# Patient Record
Sex: MALE | Race: WHITE | NOT HISPANIC OR LATINO | Employment: FULL TIME | URBAN - METROPOLITAN AREA
[De-identification: names, ages, dates, MRNs, and addresses within clinical notes are randomized per-mention and may not be internally consistent; named-entity substitution may affect disease eponyms.]

---

## 2021-05-27 ENCOUNTER — RECORDS - HEALTHEAST (OUTPATIENT)
Dept: ADMINISTRATIVE | Facility: CLINIC | Age: 56
End: 2021-05-27

## 2021-05-29 ENCOUNTER — RECORDS - HEALTHEAST (OUTPATIENT)
Dept: ADMINISTRATIVE | Facility: CLINIC | Age: 56
End: 2021-05-29

## 2024-05-24 ENCOUNTER — APPOINTMENT (OUTPATIENT)
Dept: CT IMAGING | Facility: HOSPITAL | Age: 59
DRG: 392 | End: 2024-05-24
Attending: EMERGENCY MEDICINE
Payer: COMMERCIAL

## 2024-05-24 ENCOUNTER — HOSPITAL ENCOUNTER (INPATIENT)
Facility: HOSPITAL | Age: 59
LOS: 3 days | Discharge: HOME | DRG: 392 | End: 2024-05-27
Attending: EMERGENCY MEDICINE | Admitting: FAMILY MEDICINE
Payer: COMMERCIAL

## 2024-05-24 DIAGNOSIS — K57.92 ACUTE DIVERTICULITIS: ICD-10-CM

## 2024-05-24 LAB
ALBUMIN SERPL BCG-MCNC: 4.6 G/DL (ref 3.5–5.2)
ALBUMIN UR-MCNC: NEGATIVE MG/DL
ALP SERPL-CCNC: 99 U/L (ref 40–150)
ALT SERPL W P-5'-P-CCNC: 37 U/L (ref 0–70)
ANION GAP SERPL CALCULATED.3IONS-SCNC: 13 MMOL/L (ref 7–15)
APPEARANCE UR: CLEAR
AST SERPL W P-5'-P-CCNC: 30 U/L (ref 0–45)
BASOPHILS # BLD AUTO: 0 10E3/UL (ref 0–0.2)
BASOPHILS NFR BLD AUTO: 0 %
BILIRUB SERPL-MCNC: 1.3 MG/DL
BILIRUB UR QL STRIP: NEGATIVE
BUN SERPL-MCNC: 19.9 MG/DL (ref 6–20)
CALCIUM SERPL-MCNC: 9.8 MG/DL (ref 8.6–10)
CHLORIDE SERPL-SCNC: 100 MMOL/L (ref 98–107)
COLOR UR AUTO: COLORLESS
CREAT SERPL-MCNC: 1.12 MG/DL (ref 0.67–1.17)
DEPRECATED HCO3 PLAS-SCNC: 23 MMOL/L (ref 22–29)
EGFRCR SERPLBLD CKD-EPI 2021: 77 ML/MIN/1.73M2
EOSINOPHIL # BLD AUTO: 0 10E3/UL (ref 0–0.7)
EOSINOPHIL NFR BLD AUTO: 0 %
ERYTHROCYTE [DISTWIDTH] IN BLOOD BY AUTOMATED COUNT: 11.8 % (ref 10–15)
GLUCOSE SERPL-MCNC: 170 MG/DL (ref 70–99)
GLUCOSE UR STRIP-MCNC: NEGATIVE MG/DL
HCT VFR BLD AUTO: 45.7 % (ref 40–53)
HGB BLD-MCNC: 16.4 G/DL (ref 13.3–17.7)
HGB UR QL STRIP: NEGATIVE
HOLD SPECIMEN: NORMAL
HOLD SPECIMEN: NORMAL
IMM GRANULOCYTES # BLD: 0.1 10E3/UL
IMM GRANULOCYTES NFR BLD: 0 %
KETONES UR STRIP-MCNC: NEGATIVE MG/DL
LACTATE SERPL-SCNC: 0.9 MMOL/L (ref 0.7–2)
LEUKOCYTE ESTERASE UR QL STRIP: NEGATIVE
LYMPHOCYTES # BLD AUTO: 0.6 10E3/UL (ref 0.8–5.3)
LYMPHOCYTES NFR BLD AUTO: 3 %
MCH RBC QN AUTO: 30.8 PG (ref 26.5–33)
MCHC RBC AUTO-ENTMCNC: 35.9 G/DL (ref 31.5–36.5)
MCV RBC AUTO: 86 FL (ref 78–100)
MONOCYTES # BLD AUTO: 1.2 10E3/UL (ref 0–1.3)
MONOCYTES NFR BLD AUTO: 6 %
NEUTROPHILS # BLD AUTO: 18 10E3/UL (ref 1.6–8.3)
NEUTROPHILS NFR BLD AUTO: 91 %
NITRATE UR QL: NEGATIVE
NRBC # BLD AUTO: 0 10E3/UL
NRBC BLD AUTO-RTO: 0 /100
PH UR STRIP: 6 [PH] (ref 5–7)
PLATELET # BLD AUTO: 273 10E3/UL (ref 150–450)
POTASSIUM SERPL-SCNC: 3.7 MMOL/L (ref 3.4–5.3)
PROT SERPL-MCNC: 7.6 G/DL (ref 6.4–8.3)
RBC # BLD AUTO: 5.33 10E6/UL (ref 4.4–5.9)
RBC URINE: 0 /HPF
SODIUM SERPL-SCNC: 136 MMOL/L (ref 135–145)
SP GR UR STRIP: 1 (ref 1–1.03)
UROBILINOGEN UR STRIP-MCNC: <2 MG/DL
WBC # BLD AUTO: 19.9 10E3/UL (ref 4–11)
WBC URINE: 0 /HPF

## 2024-05-24 PROCEDURE — 120N000001 HC R&B MED SURG/OB

## 2024-05-24 PROCEDURE — 83605 ASSAY OF LACTIC ACID: CPT | Performed by: EMERGENCY MEDICINE

## 2024-05-24 PROCEDURE — 96376 TX/PRO/DX INJ SAME DRUG ADON: CPT

## 2024-05-24 PROCEDURE — 81001 URINALYSIS AUTO W/SCOPE: CPT | Performed by: EMERGENCY MEDICINE

## 2024-05-24 PROCEDURE — 99222 1ST HOSP IP/OBS MODERATE 55: CPT | Performed by: FAMILY MEDICINE

## 2024-05-24 PROCEDURE — 99222 1ST HOSP IP/OBS MODERATE 55: CPT | Performed by: SURGERY

## 2024-05-24 PROCEDURE — 82040 ASSAY OF SERUM ALBUMIN: CPT | Performed by: EMERGENCY MEDICINE

## 2024-05-24 PROCEDURE — 36415 COLL VENOUS BLD VENIPUNCTURE: CPT | Performed by: EMERGENCY MEDICINE

## 2024-05-24 PROCEDURE — 96375 TX/PRO/DX INJ NEW DRUG ADDON: CPT

## 2024-05-24 PROCEDURE — 82374 ASSAY BLOOD CARBON DIOXIDE: CPT | Performed by: EMERGENCY MEDICINE

## 2024-05-24 PROCEDURE — 250N000013 HC RX MED GY IP 250 OP 250 PS 637: Performed by: FAMILY MEDICINE

## 2024-05-24 PROCEDURE — 99285 EMERGENCY DEPT VISIT HI MDM: CPT | Mod: 25

## 2024-05-24 PROCEDURE — 96361 HYDRATE IV INFUSION ADD-ON: CPT

## 2024-05-24 PROCEDURE — 85025 COMPLETE CBC W/AUTO DIFF WBC: CPT | Performed by: EMERGENCY MEDICINE

## 2024-05-24 PROCEDURE — 87040 BLOOD CULTURE FOR BACTERIA: CPT | Performed by: EMERGENCY MEDICINE

## 2024-05-24 PROCEDURE — 258N000003 HC RX IP 258 OP 636: Performed by: FAMILY MEDICINE

## 2024-05-24 PROCEDURE — 250N000011 HC RX IP 250 OP 636: Performed by: FAMILY MEDICINE

## 2024-05-24 PROCEDURE — 258N000003 HC RX IP 258 OP 636: Performed by: EMERGENCY MEDICINE

## 2024-05-24 PROCEDURE — 250N000011 HC RX IP 250 OP 636: Performed by: EMERGENCY MEDICINE

## 2024-05-24 PROCEDURE — 74177 CT ABD & PELVIS W/CONTRAST: CPT

## 2024-05-24 PROCEDURE — 96374 THER/PROPH/DIAG INJ IV PUSH: CPT

## 2024-05-24 RX ORDER — PIPERACILLIN SODIUM, TAZOBACTAM SODIUM 3; .375 G/15ML; G/15ML
3.38 INJECTION, POWDER, LYOPHILIZED, FOR SOLUTION INTRAVENOUS EVERY 8 HOURS
Status: DISCONTINUED | OUTPATIENT
Start: 2024-05-24 | End: 2024-05-27

## 2024-05-24 RX ORDER — MORPHINE SULFATE 4 MG/ML
4 INJECTION, SOLUTION INTRAMUSCULAR; INTRAVENOUS ONCE
Status: COMPLETED | OUTPATIENT
Start: 2024-05-24 | End: 2024-05-24

## 2024-05-24 RX ORDER — IOPAMIDOL 755 MG/ML
90 INJECTION, SOLUTION INTRAVASCULAR ONCE
Status: COMPLETED | OUTPATIENT
Start: 2024-05-24 | End: 2024-05-24

## 2024-05-24 RX ORDER — HYDROMORPHONE HCL IN WATER/PF 6 MG/30 ML
0.2 PATIENT CONTROLLED ANALGESIA SYRINGE INTRAVENOUS
Status: DISCONTINUED | OUTPATIENT
Start: 2024-05-24 | End: 2024-05-25

## 2024-05-24 RX ORDER — CALCIUM CARBONATE 500 MG/1
1000 TABLET, CHEWABLE ORAL 4 TIMES DAILY PRN
Status: DISCONTINUED | OUTPATIENT
Start: 2024-05-24 | End: 2024-05-27 | Stop reason: HOSPADM

## 2024-05-24 RX ORDER — ONDANSETRON 4 MG/1
4 TABLET, ORALLY DISINTEGRATING ORAL EVERY 6 HOURS PRN
Status: DISCONTINUED | OUTPATIENT
Start: 2024-05-24 | End: 2024-05-27 | Stop reason: HOSPADM

## 2024-05-24 RX ORDER — ACETAMINOPHEN 325 MG/1
650 TABLET ORAL EVERY 4 HOURS PRN
Status: DISCONTINUED | OUTPATIENT
Start: 2024-05-24 | End: 2024-05-27 | Stop reason: HOSPADM

## 2024-05-24 RX ORDER — ENOXAPARIN SODIUM 100 MG/ML
40 INJECTION SUBCUTANEOUS EVERY 24 HOURS
Status: DISCONTINUED | OUTPATIENT
Start: 2024-05-24 | End: 2024-05-27

## 2024-05-24 RX ORDER — NAPROXEN SODIUM 220 MG
440 TABLET ORAL
Status: ON HOLD | COMMUNITY
End: 2024-05-27

## 2024-05-24 RX ORDER — PIPERACILLIN SODIUM, TAZOBACTAM SODIUM 3; .375 G/15ML; G/15ML
3.38 INJECTION, POWDER, LYOPHILIZED, FOR SOLUTION INTRAVENOUS ONCE
Status: COMPLETED | OUTPATIENT
Start: 2024-05-24 | End: 2024-05-24

## 2024-05-24 RX ORDER — ACETAMINOPHEN 650 MG/1
650 SUPPOSITORY RECTAL EVERY 4 HOURS PRN
Status: DISCONTINUED | OUTPATIENT
Start: 2024-05-24 | End: 2024-05-27 | Stop reason: HOSPADM

## 2024-05-24 RX ORDER — LIDOCAINE 40 MG/G
CREAM TOPICAL
Status: DISCONTINUED | OUTPATIENT
Start: 2024-05-24 | End: 2024-05-27 | Stop reason: HOSPADM

## 2024-05-24 RX ORDER — HYDROMORPHONE HCL IN WATER/PF 6 MG/30 ML
0.4 PATIENT CONTROLLED ANALGESIA SYRINGE INTRAVENOUS
Status: DISCONTINUED | OUTPATIENT
Start: 2024-05-24 | End: 2024-05-25

## 2024-05-24 RX ORDER — ONDANSETRON 2 MG/ML
4 INJECTION INTRAMUSCULAR; INTRAVENOUS ONCE
Status: COMPLETED | OUTPATIENT
Start: 2024-05-24 | End: 2024-05-24

## 2024-05-24 RX ORDER — AMOXICILLIN 250 MG
1 CAPSULE ORAL 2 TIMES DAILY PRN
Status: DISCONTINUED | OUTPATIENT
Start: 2024-05-24 | End: 2024-05-27 | Stop reason: HOSPADM

## 2024-05-24 RX ORDER — DOCUSATE SODIUM 100 MG/1
100 CAPSULE, LIQUID FILLED ORAL 2 TIMES DAILY
Status: DISCONTINUED | OUTPATIENT
Start: 2024-05-24 | End: 2024-05-27 | Stop reason: HOSPADM

## 2024-05-24 RX ORDER — PROCHLORPERAZINE MALEATE 10 MG
10 TABLET ORAL EVERY 6 HOURS PRN
Status: DISCONTINUED | OUTPATIENT
Start: 2024-05-24 | End: 2024-05-27 | Stop reason: HOSPADM

## 2024-05-24 RX ORDER — SODIUM CHLORIDE, SODIUM LACTATE, POTASSIUM CHLORIDE, CALCIUM CHLORIDE 600; 310; 30; 20 MG/100ML; MG/100ML; MG/100ML; MG/100ML
INJECTION, SOLUTION INTRAVENOUS CONTINUOUS
Status: DISCONTINUED | OUTPATIENT
Start: 2024-05-24 | End: 2024-05-25

## 2024-05-24 RX ORDER — PROCHLORPERAZINE 25 MG
25 SUPPOSITORY, RECTAL RECTAL EVERY 12 HOURS PRN
Status: DISCONTINUED | OUTPATIENT
Start: 2024-05-24 | End: 2024-05-27 | Stop reason: HOSPADM

## 2024-05-24 RX ORDER — AMOXICILLIN 250 MG
2 CAPSULE ORAL 2 TIMES DAILY PRN
Status: DISCONTINUED | OUTPATIENT
Start: 2024-05-24 | End: 2024-05-27 | Stop reason: HOSPADM

## 2024-05-24 RX ORDER — ONDANSETRON 2 MG/ML
4 INJECTION INTRAMUSCULAR; INTRAVENOUS EVERY 6 HOURS PRN
Status: DISCONTINUED | OUTPATIENT
Start: 2024-05-24 | End: 2024-05-27 | Stop reason: HOSPADM

## 2024-05-24 RX ADMIN — SODIUM CHLORIDE 1000 ML: 9 INJECTION, SOLUTION INTRAVENOUS at 06:52

## 2024-05-24 RX ADMIN — HYDROMORPHONE HYDROCHLORIDE 0.4 MG: 0.2 INJECTION, SOLUTION INTRAMUSCULAR; INTRAVENOUS; SUBCUTANEOUS at 19:05

## 2024-05-24 RX ADMIN — ONDANSETRON 4 MG: 2 INJECTION INTRAMUSCULAR; INTRAVENOUS at 06:50

## 2024-05-24 RX ADMIN — MORPHINE SULFATE 4 MG: 4 INJECTION, SOLUTION INTRAMUSCULAR; INTRAVENOUS at 09:09

## 2024-05-24 RX ADMIN — DOCUSATE SODIUM 100 MG: 100 CAPSULE, LIQUID FILLED ORAL at 12:44

## 2024-05-24 RX ADMIN — ONDANSETRON 4 MG: 2 INJECTION INTRAMUSCULAR; INTRAVENOUS at 23:17

## 2024-05-24 RX ADMIN — HYDROMORPHONE HYDROCHLORIDE 0.4 MG: 0.2 INJECTION, SOLUTION INTRAMUSCULAR; INTRAVENOUS; SUBCUTANEOUS at 13:30

## 2024-05-24 RX ADMIN — ONDANSETRON 4 MG: 2 INJECTION INTRAMUSCULAR; INTRAVENOUS at 13:38

## 2024-05-24 RX ADMIN — MORPHINE SULFATE 4 MG: 4 INJECTION, SOLUTION INTRAMUSCULAR; INTRAVENOUS at 06:59

## 2024-05-24 RX ADMIN — ENOXAPARIN SODIUM 40 MG: 40 INJECTION SUBCUTANEOUS at 17:33

## 2024-05-24 RX ADMIN — PIPERACILLIN AND TAZOBACTAM 3.38 G: 3; .375 INJECTION, POWDER, FOR SOLUTION INTRAVENOUS at 23:22

## 2024-05-24 RX ADMIN — DOCUSATE SODIUM 100 MG: 100 CAPSULE, LIQUID FILLED ORAL at 19:47

## 2024-05-24 RX ADMIN — SODIUM CHLORIDE, POTASSIUM CHLORIDE, SODIUM LACTATE AND CALCIUM CHLORIDE: 600; 310; 30; 20 INJECTION, SOLUTION INTRAVENOUS at 12:44

## 2024-05-24 RX ADMIN — IOPAMIDOL 90 ML: 755 INJECTION, SOLUTION INTRAVENOUS at 07:41

## 2024-05-24 RX ADMIN — PIPERACILLIN AND TAZOBACTAM 3.38 G: 3; .375 INJECTION, POWDER, FOR SOLUTION INTRAVENOUS at 08:59

## 2024-05-24 RX ADMIN — PIPERACILLIN AND TAZOBACTAM 3.38 G: 3; .375 INJECTION, POWDER, FOR SOLUTION INTRAVENOUS at 13:58

## 2024-05-24 ASSESSMENT — COLUMBIA-SUICIDE SEVERITY RATING SCALE - C-SSRS
2. HAVE YOU ACTUALLY HAD ANY THOUGHTS OF KILLING YOURSELF IN THE PAST MONTH?: NO
1. IN THE PAST MONTH, HAVE YOU WISHED YOU WERE DEAD OR WISHED YOU COULD GO TO SLEEP AND NOT WAKE UP?: NO
6. HAVE YOU EVER DONE ANYTHING, STARTED TO DO ANYTHING, OR PREPARED TO DO ANYTHING TO END YOUR LIFE?: NO

## 2024-05-24 ASSESSMENT — ACTIVITIES OF DAILY LIVING (ADL)
ADLS_ACUITY_SCORE: 35
ADLS_ACUITY_SCORE: 35
ADLS_ACUITY_SCORE: 20
ADLS_ACUITY_SCORE: 20
ADLS_ACUITY_SCORE: 35
ADLS_ACUITY_SCORE: 20
ADLS_ACUITY_SCORE: 35

## 2024-05-24 NOTE — ED NOTES
"Pt states last thing he ate was last night out at dinner. Frequent emesis last night, pain localized to the lower left quadrant. Hx of diverticulitis, pt states \"this feels similar\"   "

## 2024-05-24 NOTE — H&P
"[facility]    History and Physical - Hospitalist Service       Date of Admission:  5/24/2024    Assessment & Plan      Chase Mcdaniels is a 58 year old male admitted on 5/24/2024 with recurrent diverticulitis with microperforation and abscess    Complicated diverticulitis  --- Second episode.  Previous episode 5/2023 treated as outpatient and had microperforation at that time  --- Acute onset symptoms yesterday evening  --- Current CT with microperforation as well as 2 cm intermural abscess in sigmoid colon too small to drain  --- Admit, continue n.p.o. status  --- Continue Zosyn  --- Surgery consult, second episode  --- Fluids and IV pain control  --- Blood culture and lactic acid requested.  --- Trend WBC          Diet: NPO for Medical/Clinical Reasons Except for: No Exceptions    DVT Prophylaxis: Enoxaparin (Lovenox) SQ  Frazier Catheter: Not present  Lines: None     Cardiac Monitoring: None  Code Status:  full code    Clinically Significant Risk Factors Present on Admission                       # Obesity: Estimated body mass index is 30.12 kg/m  as calculated from the following:    Height as of this encounter: 1.778 m (5' 10\").    Weight as of this encounter: 95.2 kg (209 lb 14.4 oz).              Disposition Plan     Medically Ready for Discharge: Anticipated in 2-4 Days           Latrice Mccall MD  Hospitalist Service  [facility]  Securely message with [employer] (more info)  Text page via [employer] Paging/Directory     ______________________________________________________________________    Chief Complaint   Abdominal pain, nausea and vomiting and diarrhea    History is obtained from the patient    History of Present Illness   Chase Mcdaniels is a 58 year old healthy male with 1 previous episode diverticulitis treated about 1 year ago through emergency room and previous colonoscopies but none since previous diverticulitis infection came into the emergency department " for further workup and evaluation of abrupt onset of pain nausea vomiting or diarrhea.  Patient was in his usual state of health until 7 PM last evening when he experienced excruciating left lower quadrant pain accompanied by nausea and vomiting several times.  This is also accompanied by diarrhea and fevers chills.  He came to the emergency room department where CT scan shows acute sigmoid diverticulitis with microperforation and 2 cm intermural abscess in the sigmoid colon and he is being admitted.    Note, previous episode almost 1 year ago to the date and also was accompanied by microperforation at that time      Past Medical History    History reviewed. No pertinent past medical history.    Past Surgical History   History reviewed. No pertinent surgical history.    Prior to Admission Medications   Prior to Admission Medications   Prescriptions Last Dose Informant Patient Reported? Taking?   naproxen sodium (ANAPROX) 220 MG tablet 5/24/2024 at took dose of 4 tablets around 05:00 today  Yes Yes   Sig: Take 440 mg by mouth once as needed for moderate pain      Facility-Administered Medications: None        Review of Systems    The 5 point Review of Systems is negative other than noted in the HPI or here.      Physical Exam   Vital Signs: Temp: 98.5  F (36.9  C) Temp src: Temporal BP: 136/74 Pulse: 82   Resp: 16 SpO2: 95 % O2 Device: None (Room air)    Weight: 209 lbs 14.4 oz    General Appearance: Pleasant male, nontoxic in appearance  Respiratory: Clear to auscultation bilaterally  Cardiovascular: Regular rate and without murmurs rubs or gallops  GI: He has tenderness in left lower quadrant with mild rebound tenderness.  No guarding  Skin: No significant lower extremity edema  Other: Neurologically gross intact without focal deficits appreciated    Medical Decision Making             Data     I have personally reviewed the following data over the past 24 hrs:    19.9 (H)  \   16.4   / 273     136 100 19.9 /  170  (H)   3.7 23 1.12 \     ALT: 37 AST: 30 AP: 99 TBILI: 1.3 (H)   ALB: 4.6 TOT PROTEIN: 7.6 LIPASE: N/A     Procal: N/A CRP: N/A Lactic Acid: 0.9         Imaging results reviewed over the past 24 hrs:   Recent Results (from the past 24 hour(s))   CT Abdomen Pelvis w Contrast    Narrative    EXAM: CT ABDOMEN PELVIS W CONTRAST  LOCATION: [facility]  DATE: 5/24/2024    INDICATION: LLQ pain  COMPARISON: 5/17/2023  TECHNIQUE: CT scan of the abdomen and pelvis was performed following injection of IV contrast. Multiplanar reformats were obtained. Dose reduction techniques were used.  CONTRAST: 90 mL Isovue-370    FINDINGS:   LOWER CHEST: Normal.    HEPATOBILIARY: Hepatic steatosis. Hepatomegaly measuring 20.9 cm in craniocaudal dimension.    PANCREAS: Normal.    SPLEEN: Stable splenomegaly.    ADRENAL GLANDS: Normal.    KIDNEYS/BLADDER: Normal kidneys. Mild superior bladder wall thickening which is likely reactive.    BOWEL: Inflammatory changes around the sigmoid colon are similar to prior and consistent with acute diverticulitis. A few small foci of adjacent extraluminal air are consistent with microperforation. No remote pneumoperitoneum. A thick-walled 2.0 x 1.5   cm fluid collection in the wall of the sigmoid colon is consistent with intramural abscess, image 185:3. No obstruction. Normal appendix.    LYMPH NODES: Normal.    VASCULATURE: No abdominal aortic aneurysm.    PELVIC ORGANS: Trace free fluid in the pelvis. Bilateral inguinal herniorrhaphy. Stable small fat-containing left inguinal hernia.    MUSCULOSKELETAL: Degenerative changes.      Impression    IMPRESSION:   1.  Acute sigmoid diverticulitis with microperforation.  2.  Interval development of a 2.0 cm intramural abscess in the sigmoid colon. This is too small for percutaneous drainage.  3.  Hepatomegaly with hepatic steatosis.  4.  Splenomegaly.

## 2024-05-24 NOTE — CONSULTS
HPI  58 year old year old male who I have been consulted by No ref. provider found for evaluation of Nausea, Vomiting, & Diarrhea; Hallucinations; and Abdominal Pain    58-year-old male with previous history of diverticulitis with 1 episode who presents with 24 hours of abdominal pain.  Pain started in the left lower quadrant persisted.  Denies any fever chills nausea vomiting.  Has had some diarrhea recently but denies any previous issues with constipation.  Last colonoscopy was approximately 3 years ago which he states was normal.    Allergies:Patient has no known allergies.    History reviewed. No pertinent past medical history.    History reviewed. No pertinent surgical history.      CURRENT MEDS:    Current Facility-Administered Medications:     acetaminophen (TYLENOL) tablet 650 mg, 650 mg, Oral, Q4H PRN **OR** acetaminophen (TYLENOL) Suppository 650 mg, 650 mg, Rectal, Q4H PRN, Latrice Mccall MD    calcium carbonate (TUMS) chewable tablet 1,000 mg, 1,000 mg, Oral, 4x Daily PRN, Latrice Mccall MD    docusate sodium (COLACE) capsule 100 mg, 100 mg, Oral, BID, Latrice Mccall MD, 100 mg at 05/24/24 1244    enoxaparin ANTICOAGULANT (LOVENOX) injection 40 mg, 40 mg, Subcutaneous, Q24H, Latrice Mccall MD    HYDROmorphone (DILAUDID) injection 0.2 mg, 0.2 mg, Intravenous, Q2H PRN, Latrice Mccall MD    HYDROmorphone (DILAUDID) injection 0.4 mg, 0.4 mg, Intravenous, Q2H PRN, Latrice Mccall MD, 0.4 mg at 05/24/24 1330    lactated ringers infusion, , Intravenous, Continuous, Latrice Mccall MD, Last Rate: 100 mL/hr at 05/24/24 1244, New Bag at 05/24/24 1244    lidocaine (LMX4) cream, , Topical, Q1H PRN, Latrice Mccall MD    lidocaine 1 % 0.1-1 mL, 0.1-1 mL, Other, Q1H PRN, Latrice Mccall MD    melatonin tablet 5 mg, 5 mg, Oral, At Bedtime PRN, Latrice Mccall MD    ondansetron (ZOFRAN ODT) ODT tab 4 mg, 4 mg, Oral, Q6H PRN **OR** ondansetron (ZOFRAN) injection 4 mg, 4 mg, Intravenous, Q6H PRN, Latrice Mccall MD, 4 mg at 05/24/24  1338    piperacillin-tazobactam (ZOSYN) 3.375 g vial to attach to  mL bag, 3.375 g, Intravenous, Q8H, Latrice Mccall MD, 3.375 g at 05/24/24 1358    prochlorperazine (COMPAZINE) injection 10 mg, 10 mg, Intravenous, Q6H PRN **OR** prochlorperazine (COMPAZINE) tablet 10 mg, 10 mg, Oral, Q6H PRN **OR** prochlorperazine (COMPAZINE) suppository 25 mg, 25 mg, Rectal, Q12H PRN, Latrice Mccall MD    senna-docusate (SENOKOT-S/PERICOLACE) 8.6-50 MG per tablet 1 tablet, 1 tablet, Oral, BID PRN **OR** senna-docusate (SENOKOT-S/PERICOLACE) 8.6-50 MG per tablet 2 tablet, 2 tablet, Oral, BID PRN, Latrice Mccall MD    sodium chloride (PF) 0.9% PF flush 3 mL, 3 mL, Intracatheter, Q8H, Latrice Mccall MD, 3 mL at 05/24/24 1244    sodium chloride (PF) 0.9% PF flush 3 mL, 3 mL, Intracatheter, q1 min prn, Latrice Mccall MD    Current Outpatient Medications:     naproxen sodium (ANAPROX) 220 MG tablet, Take 440 mg by mouth once as needed for moderate pain, Disp: , Rfl:     FAMX-reviewed     reports that he has never smoked. He does not have any smokeless tobacco history on file. He reports that he does not currently use alcohol. He reports that he does not use drugs.    Review of Systems:  The 12 point review of systems  is within normal limits except for as mentioned above in the HPI.  General ROS: No complaints or constitutional symptoms  Ophthalmic ROS: No complaints of visual changes  Skin: No complaints or symptoms   Endocrine: No complaints or symptoms  Hematologic/Lymphatic: No symptoms or complaints  Psychiatric: No symptoms or complaints  Respiratory ROS: no cough, shortness of breath, or wheezing  Cardiovascular ROS: no chest pain or dyspnea on exertion  Gastrointestinal ROS: As per HPI  Genito-Urinary ROS: no dysuria, trouble voiding, or hematuria  Musculoskeletal ROS: no joint or muscle pain  Neurological ROS: no TIA or stroke symptoms      EXAM:  /73 (BP Location: Left arm)   Pulse 76   Temp 97.8  F (36.6  C) (Oral)  "  Resp 17   Ht 1.778 m (5' 10\")   Wt 93.1 kg (205 lb 4 oz)   SpO2 95%   BMI 29.45 kg/m    GENERAL: Well developed male, No acute distress, pleasant and conversant   EYES: Pupils equal, round and reactive, no scleral icterus  ABDOMEN: Soft, tender to palpation left lower quadrant with focal guarding, no peritoneal findings  SKIN: Pink, warm and dry, no obvious rashes or lesions   NEURO:No focal deficits, ambulatory  MUSCULOSKELETAL:No obvious deformities, no swelling, normal appearing      LABS:  Lab Results   Component Value Date    WBC 19.9 05/24/2024    HGB 16.4 05/24/2024    HCT 45.7 05/24/2024    MCV 86 05/24/2024     05/24/2024     INR/Prothrombin Time  Recent Labs   Lab 05/24/24  0648      CO2 23   BUN 19.9     Lab Results   Component Value Date    ALT 37 05/24/2024    AST 30 05/24/2024    ALKPHOS 99 05/24/2024       IMAGES:   Relevant images were reviewed and discussed with the patient.  Notable findings were: CT scan evidence demonstrates an intramural abscess with no free fluid or free air    Assessment/Plan:   Chase Mcdaniels is a 58 year old male with diverticulitis.  The pathophysiology of diverticulitis was explained to the patient and his family.  The plan will be for observation at this point with IV antibiotics.  Please keep the patient n.p.o. for now.  Surgery will continue to follow      Darnell Scott D.O. FACS  [phone]  "

## 2024-05-24 NOTE — PHARMACY-ADMISSION MEDICATION HISTORY
Pharmacist Admission Medication History    Admission medication history is complete. The information provided in this note is only as accurate as the sources available at the time of the update.    Information Source(s): Patient via in-person    Pertinent Information: Patient took one time dose of Naproxen 4 tablets this am around 05:00. Patient takes no other medications PTA.    Changes made to PTA medication list:  Added: None  Deleted: Albuterol, Azithromycin  Changed: None    Allergies reviewed with patient and updates made in EHR: yes    Medication History Completed By: ANIYA SYKES RPH 5/24/2024 8:44 AM    PTA Med List   Medication Sig Last Dose    naproxen sodium (ANAPROX) 220 MG tablet Take 440 mg by mouth once as needed for moderate pain 5/24/2024 at took dose of 4 tablets around 05:00 today

## 2024-05-24 NOTE — ED TRIAGE NOTES
Pt. Stated that he's been having N/V/D since last night. Hot and cold sweats and thought that he was hallucinating this morning. Pt. Tried taking Aleve a couple hrs ago but has had no improvement to pain. Most pain is LLQ and it is painful to urinate.      Triage Assessment (Adult)       Row Name 05/24/24 0680          Triage Assessment    Airway WDL WDL        Respiratory WDL    Respiratory WDL WDL        Skin Circulation/Temperature WDL    Skin Circulation/Temperature WDL WDL        Cardiac WDL    Cardiac WDL WDL        Peripheral/Neurovascular WDL    Peripheral Neurovascular WDL WDL        Cognitive/Neuro/Behavioral WDL    Cognitive/Neuro/Behavioral WDL WDL

## 2024-05-24 NOTE — PROGRESS NOTES
Pt AxOx4 on room air. PRN IV dilaudid and IV zofran given for moderate abdominal pain and nausea. LR infusing at 100 ml/hr. On IV abx. NPO.

## 2024-05-24 NOTE — ED NOTES
"[facility] ED Handoff Report    ED Chief Complaint: Nausea, vomiting, diarrhea    ED Diagnosis:  (K57.92) Acute diverticulitis  Comment: Patient reports history of 1 past episode approximately 1 year ago. He was given PO antibiotics at that time  Plan: Admit, antibiotics       PMH:  History reviewed. No pertinent past medical history.     Code Status:  No Order     Falls Risk: No Band: Not applicable    Current Living Situation/Residence: lives alone     Elimination Status: Continent: Yes     Activity Level: Independent    Patients Preferred Language:  English     Needed: No    Vital Signs:  /74   Pulse 83   Temp 98.5  F (36.9  C) (Temporal)   Resp 16   Ht 1.778 m (5' 10\")   Wt 95.2 kg (209 lb 14.4 oz)   SpO2 94%   BMI 30.12 kg/m       Cardiac Rhythm:     Pain Score: 7/10. 3/10 with morphine per order    Is the Patient Confused:  No    Last Food or Drink: 5/23/24 at around 7pm    Focused Assessment:  Per triage RN Diya Bess:   Pt. Stated that he's been having N/V/D since last night. Hot and cold sweats and thought that he was hallucinating this morning. Pt. Tried taking Aleve a couple hrs ago but has had no improvement to pain. Most pain is LLQ and it is painful to urinate.         Patient reports 7/10 left lower quadrant abdominal pain, worse with palpation and movement. Hx diverticulitis. Patient reports last emesis around 11pm yesterday 5/23 and last diarrhea episode around 5am with no blood. Hypoactive to absent bowel sounds noted in all quadrants. A&O x4, ambulates independently.       Tests Performed: Done: Labs and Imaging    Treatments Provided:  Zosyn, fluids, morphine    Family Dynamics/Concerns: No    Family Updated On Visitor Policy: Yes    Plan of Care Communicated to Family: Yes    Who Was Updated about Plan of Care: Patient     Belongings Checklist Done and Signed by Patient: Yes    Belongings Sent with Patient: Clothing    Medications sent with " patient: none    Covid: symptomatic    Additional Information:     RN: Valencia Barakat RN   5/24/2024 9:40 AM

## 2024-05-24 NOTE — ED PROVIDER NOTES
EMERGENCY DEPARTMENT ENCOUnter      NAME: Chase Mcdaniels  AGE: 58 year old male  YOB: 1965  MRN: [id number]  EVALUATION DATE & TIME: 2024  6:40 AM    PCP: Bernard Prater    ED PROVIDER: Neo Ramirez DO      Chief Complaint   Patient presents with    Nausea, Vomiting, & Diarrhea    Hallucinations    Abdominal Pain         FINAL IMPRESSION:  1. Acute diverticulitis          ED COURSE & MEDICAL DECISION MAKIN:55 AM I met with the patient to gather history and perform an initial exam.  10:41 AM I talked to Dr. Scott, surgery.    The patient presented to the emergency department today complaining of left lower quadrant abdominal pain.  He has a history of diverticulitis.  He had moderate tenderness in this area on exam.  Laboratory testing reveals an elevated white blood cell count.  CT shows signs of acute diverticulitis with microperforation.  Antibiotics have been started and he will be kept in the hospital.  His case was also discussed with general surgery and they have evaluated him here in the emergency department.      Medical Decision Making  Obtained supplemental history:Supplemental history obtained?: No  Reviewed external records: External records reviewed?: No  Care impacted by chronic illness:N/A  Care significantly affected by social determinants of health:N/A  Did you consider but not order tests?: Work up considered but not performed and documented in chart, if applicable  Did you interpret images independently?: Independent interpretation of ECG and images noted in documentation, when applicable.  Consultation discussion with other provider:Did you involve another provider (consultant, MH, pharmacy, etc.)?: I discussed the care with another health care provider, see documentation for details.  Admit.    At the conclusion of the encounter I discussed the results of all of the tests and the disposition. The questions were answered. The patient or family acknowledged  "understanding and was agreeable with the care plan.     =================================================================    HPI        Chase Mcdaniels is a 58 year old male with a pertinent history of diverticulitis who presents to this ED via walk-in for evaluation of vomit, diarrhea, and lower left abdominal pain.     Patient reports that yesterday night at 1900, he as feeling hot all night and had an episode of vomit along with diarrhea and major lower left abdominal pain. He also mentions that there is pain with urination where he gets body aches. Says that he went to an asian restaurant yesterday where he did have a \"weird\" feeling but ate nothing out of the ordinary. He had diverticulitis about a year ago and doesn't know if his symptoms today feels like it.       PAST MEDICAL HISTORY:  History reviewed. No pertinent past medical history.    PAST SURGICAL HISTORY:  History reviewed. No pertinent surgical history.        CURRENT MEDICATIONS:    naproxen sodium (ANAPROX) 220 MG tablet        ALLERGIES:  No Known Allergies    FAMILY HISTORY:  History reviewed. No pertinent family history.    SOCIAL HISTORY:   Social History     Socioeconomic History    Marital status:      Spouse name: None    Number of children: None    Years of education: None    Highest education level: None   Tobacco Use    Smoking status: Never   Substance and Sexual Activity    Alcohol use: Not Currently    Drug use: Never   Social History Narrative    Patient works.     Social Determinants of Health     Financial Resource Strain: Low Risk  (5/17/2023)    Received from [facility]    Financial Resource Strain     Difficulty of Paying Living Expenses: 3   Food Insecurity: No Food Insecurity (5/17/2023)    Received from [facility]    Food Insecurity     Worried About Running Out of Food in the Last Year: 1   Transportation Needs: No Transportation Needs (5/17/2023) " "   Received from [facility]    Transportation Needs     Lack of Transportation (Medical): 1    Received from [facility]    Social Connections   Housing Stability: Low Risk  (5/17/2023)    Received from [facility]    Housing Stability     Unable to Pay for Housing in the Last Year: 1       VITALS:  Patient Vitals for the past 24 hrs:   BP Temp Temp src Pulse Resp SpO2 Height Weight   05/24/24 1308 120/73 97.8  F (36.6  C) Oral 64 17 97 % 1.778 m (5' 10\") 93.1 kg (205 lb 4 oz)   05/24/24 1230 -- -- -- 82 -- 92 % -- --   05/24/24 1015 -- -- -- 82 -- 95 % -- --   05/24/24 0901 -- -- -- 83 -- 94 % -- --   05/24/24 0800 136/74 -- -- 86 -- 94 % -- --   05/24/24 0725 -- -- -- 92 -- 95 % -- --   05/24/24 0700 137/82 -- -- 88 -- 95 % -- --   05/24/24 0636 (!) 142/86 98.5  F (36.9  C) Temporal 101 16 96 % 1.778 m (5' 10\") 95.2 kg (209 lb 14.4 oz)       PHYSICAL EXAM    Constitutional:  Well developed, Well nourished,  HENT:  Normocephalic, Atraumatic, Oropharynx moist, Nose normal.   Eyes:  EOMI, Conjunctiva normal, No discharge.   Respiratory:  Normal breath sounds, No respiratory distress, No wheezing, No chest tenderness.   Cardiovascular:  Normal heart rate, Normal rhythm, No murmurs  GI:  Soft, moderate left lower quadrant tenderness, No guarding, No CVA tenderness.   Musculoskeletal:  No tenderness to palpation or major deformities noted.   Extremities: No lower extremity edema.  Neurologic:  Alert & oriented x 3, No focal deficits noted.   Psychiatric:  Affect normal, Judgment normal, Mood normal.        LAB:  All pertinent labs reviewed and interpreted.  Results for orders placed or performed during the hospital encounter of 05/24/24              Comprehensive metabolic panel   Result Value Ref Range    Sodium 136 135 - 145 mmol/L    Potassium 3.7 3.4 - 5.3 mmol/L    Carbon Dioxide (CO2) 23 22 - 29 mmol/L    Anion Gap " 13 7 - 15 mmol/L    Urea Nitrogen 19.9 6.0 - 20.0 mg/dL    Creatinine 1.12 0.67 - 1.17 mg/dL    GFR Estimate 77 >60 mL/min/1.73m2    Calcium 9.8 8.6 - 10.0 mg/dL    Chloride 100 98 - 107 mmol/L    Glucose 170 (H) 70 - 99 mg/dL    Alkaline Phosphatase 99 40 - 150 U/L    AST 30 0 - 45 U/L    ALT 37 0 - 70 U/L    Protein Total 7.6 6.4 - 8.3 g/dL    Albumin 4.6 3.5 - 5.2 g/dL    Bilirubin Total 1.3 (H) <=1.2 mg/dL   CBC with platelets and differential   Result Value Ref Range    WBC Count 19.9 (H) 4.0 - 11.0 10e3/uL    RBC Count 5.33 4.40 - 5.90 10e6/uL    Hemoglobin 16.4 13.3 - 17.7 g/dL    Hematocrit 45.7 40.0 - 53.0 %    MCV 86 78 - 100 fL    MCH 30.8 26.5 - 33.0 pg    MCHC 35.9 31.5 - 36.5 g/dL    RDW 11.8 10.0 - 15.0 %    Platelet Count 273 150 - 450 10e3/uL    % Neutrophils 91 %    % Lymphocytes 3 %    % Monocytes 6 %    % Eosinophils 0 %    % Basophils 0 %    % Immature Granulocytes 0 %    NRBCs per 100 WBC 0 <1 /100    Absolute Neutrophils 18.0 (H) 1.6 - 8.3 10e3/uL    Absolute Lymphocytes 0.6 (L) 0.8 - 5.3 10e3/uL    Absolute Monocytes 1.2 0.0 - 1.3 10e3/uL    Absolute Eosinophils 0.0 0.0 - 0.7 10e3/uL    Absolute Basophils 0.0 0.0 - 0.2 10e3/uL    Absolute Immature Granulocytes 0.1 <=0.4 10e3/uL    Absolute NRBCs 0.0 10e3/uL   Extra Blue Top Tube   Result Value Ref Range    Hold Specimen JIC    Extra Red Top Tube   Result Value Ref Range    Hold Specimen JIC    UA with Microscopic reflex to Culture    Specimen: Urine, Clean Catch   Result Value Ref Range    Color Urine Colorless Colorless, Straw, Light Yellow, Yellow    Appearance Urine Clear Clear    Glucose Urine Negative Negative mg/dL    Bilirubin Urine Negative Negative    Ketones Urine Negative Negative mg/dL    Specific Gravity Urine 1.005 1.001 - 1.030    Blood Urine Negative Negative    pH Urine 6.0 5.0 - 7.0    Protein Albumin Urine Negative Negative mg/dL    Urobilinogen Urine <2.0 <2.0 mg/dL    Nitrite Urine Negative Negative    Leukocyte Esterase  Urine Negative Negative    RBC Urine 0 <=2 /HPF    WBC Urine 0 <=5 /HPF   Lactic Acid Whole Blood with 1X Repeat in 2 HR when >2   Result Value Ref Range    Lactic Acid, Initial 0.9 0.7 - 2.0 mmol/L       RADIOLOGY:  I have independently reviewed and interpreted the above imaging, pending the final radiology read.  CT Abdomen Pelvis w Contrast   Final Result   IMPRESSION:    1.  Acute sigmoid diverticulitis with microperforation.   2.  Interval development of a 2.0 cm intramural abscess in the sigmoid colon. This is too small for percutaneous drainage.   3.  Hepatomegaly with hepatic steatosis.   4.  Splenomegaly.          I, Sarina Reeves, am serving as a scribe to document services personally performed by Dr. Ramirez based on my observation and the provider's statements to me. I, Neo Ramirez, DO attest that Sarina Reeves is acting in a scribe capacity, has observed my performance of the services and has documented them in accordance with my direction.    Neo Ramirez DO  Emergency Medicine  [facility] EMERGENCY DEPARTMENT  [address] [place] [id number] [phone]  Dept: [phone]      Neo Ramirez DO  05/24/24 1687

## 2024-05-25 LAB
ERYTHROCYTE [DISTWIDTH] IN BLOOD BY AUTOMATED COUNT: 12.1 % (ref 10–15)
HCT VFR BLD AUTO: 37.7 % (ref 40–53)
HGB BLD-MCNC: 12.8 G/DL (ref 13.3–17.7)
HOLD SPECIMEN: NORMAL
MCH RBC QN AUTO: 30.4 PG (ref 26.5–33)
MCHC RBC AUTO-ENTMCNC: 34 G/DL (ref 31.5–36.5)
MCV RBC AUTO: 90 FL (ref 78–100)
PLATELET # BLD AUTO: 145 10E3/UL (ref 150–450)
RBC # BLD AUTO: 4.21 10E6/UL (ref 4.4–5.9)
WBC # BLD AUTO: 10.2 10E3/UL (ref 4–11)

## 2024-05-25 PROCEDURE — 250N000013 HC RX MED GY IP 250 OP 250 PS 637: Performed by: FAMILY MEDICINE

## 2024-05-25 PROCEDURE — 99232 SBSQ HOSP IP/OBS MODERATE 35: CPT | Performed by: SURGERY

## 2024-05-25 PROCEDURE — 250N000011 HC RX IP 250 OP 636: Performed by: INTERNAL MEDICINE

## 2024-05-25 PROCEDURE — 85027 COMPLETE CBC AUTOMATED: CPT | Performed by: FAMILY MEDICINE

## 2024-05-25 PROCEDURE — C9113 INJ PANTOPRAZOLE SODIUM, VIA: HCPCS | Performed by: INTERNAL MEDICINE

## 2024-05-25 PROCEDURE — 36415 COLL VENOUS BLD VENIPUNCTURE: CPT | Performed by: FAMILY MEDICINE

## 2024-05-25 PROCEDURE — 99232 SBSQ HOSP IP/OBS MODERATE 35: CPT | Performed by: INTERNAL MEDICINE

## 2024-05-25 PROCEDURE — 250N000011 HC RX IP 250 OP 636: Performed by: FAMILY MEDICINE

## 2024-05-25 PROCEDURE — 120N000001 HC R&B MED SURG/OB

## 2024-05-25 PROCEDURE — 258N000003 HC RX IP 258 OP 636: Performed by: FAMILY MEDICINE

## 2024-05-25 RX ORDER — NALOXONE HYDROCHLORIDE 0.4 MG/ML
0.4 INJECTION, SOLUTION INTRAMUSCULAR; INTRAVENOUS; SUBCUTANEOUS
Status: DISCONTINUED | OUTPATIENT
Start: 2024-05-25 | End: 2024-05-27 | Stop reason: HOSPADM

## 2024-05-25 RX ORDER — HYDROMORPHONE HYDROCHLORIDE 1 MG/ML
0.2 INJECTION, SOLUTION INTRAMUSCULAR; INTRAVENOUS; SUBCUTANEOUS
Status: DISCONTINUED | OUTPATIENT
Start: 2024-05-25 | End: 2024-05-27 | Stop reason: HOSPADM

## 2024-05-25 RX ORDER — HYDROMORPHONE HCL IN WATER/PF 6 MG/30 ML
0.4 PATIENT CONTROLLED ANALGESIA SYRINGE INTRAVENOUS
Status: DISCONTINUED | OUTPATIENT
Start: 2024-05-25 | End: 2024-05-25

## 2024-05-25 RX ORDER — NALOXONE HYDROCHLORIDE 0.4 MG/ML
0.2 INJECTION, SOLUTION INTRAMUSCULAR; INTRAVENOUS; SUBCUTANEOUS
Status: DISCONTINUED | OUTPATIENT
Start: 2024-05-25 | End: 2024-05-27 | Stop reason: HOSPADM

## 2024-05-25 RX ORDER — HYDROMORPHONE HCL IN WATER/PF 6 MG/30 ML
0.2 PATIENT CONTROLLED ANALGESIA SYRINGE INTRAVENOUS
Status: DISCONTINUED | OUTPATIENT
Start: 2024-05-25 | End: 2024-05-25

## 2024-05-25 RX ORDER — HYDROMORPHONE HYDROCHLORIDE 1 MG/ML
0.4 INJECTION, SOLUTION INTRAMUSCULAR; INTRAVENOUS; SUBCUTANEOUS
Status: DISCONTINUED | OUTPATIENT
Start: 2024-05-25 | End: 2024-05-27 | Stop reason: HOSPADM

## 2024-05-25 RX ADMIN — HYDROMORPHONE HYDROCHLORIDE 0.4 MG: 0.2 INJECTION, SOLUTION INTRAMUSCULAR; INTRAVENOUS; SUBCUTANEOUS at 03:19

## 2024-05-25 RX ADMIN — SODIUM CHLORIDE, POTASSIUM CHLORIDE, SODIUM LACTATE AND CALCIUM CHLORIDE: 600; 310; 30; 20 INJECTION, SOLUTION INTRAVENOUS at 04:44

## 2024-05-25 RX ADMIN — HYDROMORPHONE HYDROCHLORIDE 0.4 MG: 1 INJECTION, SOLUTION INTRAMUSCULAR; INTRAVENOUS; SUBCUTANEOUS at 14:55

## 2024-05-25 RX ADMIN — CALCIUM CARBONATE (ANTACID) CHEW TAB 500 MG 1000 MG: 500 CHEW TAB at 10:25

## 2024-05-25 RX ADMIN — ENOXAPARIN SODIUM 40 MG: 40 INJECTION SUBCUTANEOUS at 14:55

## 2024-05-25 RX ADMIN — HYDROMORPHONE HYDROCHLORIDE 0.4 MG: 1 INJECTION, SOLUTION INTRAMUSCULAR; INTRAVENOUS; SUBCUTANEOUS at 23:52

## 2024-05-25 RX ADMIN — DOCUSATE SODIUM 100 MG: 100 CAPSULE, LIQUID FILLED ORAL at 10:25

## 2024-05-25 RX ADMIN — PANTOPRAZOLE SODIUM 40 MG: 40 INJECTION, POWDER, FOR SOLUTION INTRAVENOUS at 10:25

## 2024-05-25 RX ADMIN — PIPERACILLIN AND TAZOBACTAM 3.38 G: 3; .375 INJECTION, POWDER, FOR SOLUTION INTRAVENOUS at 14:28

## 2024-05-25 RX ADMIN — DOCUSATE SODIUM 100 MG: 100 CAPSULE, LIQUID FILLED ORAL at 21:21

## 2024-05-25 RX ADMIN — PIPERACILLIN AND TAZOBACTAM 3.38 G: 3; .375 INJECTION, POWDER, FOR SOLUTION INTRAVENOUS at 06:02

## 2024-05-25 RX ADMIN — HYDROMORPHONE HYDROCHLORIDE 0.4 MG: 1 INJECTION, SOLUTION INTRAMUSCULAR; INTRAVENOUS; SUBCUTANEOUS at 19:12

## 2024-05-25 RX ADMIN — HYDROMORPHONE HYDROCHLORIDE 0.4 MG: 0.2 INJECTION, SOLUTION INTRAMUSCULAR; INTRAVENOUS; SUBCUTANEOUS at 09:00

## 2024-05-25 RX ADMIN — PIPERACILLIN AND TAZOBACTAM 3.38 G: 3; .375 INJECTION, POWDER, FOR SOLUTION INTRAVENOUS at 21:30

## 2024-05-25 RX ADMIN — ONDANSETRON 4 MG: 2 INJECTION INTRAMUSCULAR; INTRAVENOUS at 21:21

## 2024-05-25 ASSESSMENT — ACTIVITIES OF DAILY LIVING (ADL)
ADLS_ACUITY_SCORE: 20

## 2024-05-25 NOTE — UTILIZATION REVIEW
Admission Status; Secondary Review Determination   Under the authority of the Utilization Management Committee, the utilization review process indicated a secondary review on Chase Mcdaniels. The review outcome is based on review of the medical records, discussions with staff, and applying clinical experience noted on the date of the review.   (x) Inpatient Status Appropriate - This patient's medical care is consistent with medical management for inpatient care and reasonable inpatient medical practice.     RATIONALE FOR DETERMINATION   58 year old male with with past medical history of diverticulitis who came into the emergency department for the evaluation of abrupt onset of abdominal pain, nausea and vomiting , admitted with recurrent diverticulitis,  CT with microperforation as well as 2 cm intermural abscess in sigmoid colon too small to drain , surgical consult , start clear liquid diet today, on IV antibiotic and IV fluid      At the time of admission with the information available to the attending physician more than 2 nights Hospital complex care was anticipated, based on patient risk of adverse outcome if treated as outpatient and complex care required. Inpatient admission is appropriate based on the Medicare guidelines.   The information on this document is developed by the utilization review team in order for the business office to ensure compliance. This only denotes the appropriateness of proper admission status and does not reflect the quality of care rendered.   The definitions of Inpatient Status and Observation Status used in making the determination above are those provided in the CMS Coverage Manual, Chapter 1 and Chapter 6, section 70.4.   Sincerely,   Vikram Loyola MD  Utilization Review  Physician Advisor  [facility]

## 2024-05-25 NOTE — ED NOTES
"[facility] ED Handoff Report    ED Chief Complaint: LLQ abdominal pain    ED Diagnosis:  (K57.92) Acute diverticulitis  Plan: admit, iv abx       PMH:  History reviewed. No pertinent past medical history.     Code Status:  Full Code     Falls Risk: No Band: Not applicable    Current Living Situation/Residence: lives alone     Elimination Status: Continent: Yes     Activity Level: Independent    Patients Preferred Language:  English     Needed: No    Vital Signs:  /71 (BP Location: Left arm)   Pulse 75   Temp 98.2  F (36.8  C) (Oral)   Resp 14   Ht 1.778 m (5' 10\")   Wt 93.1 kg (205 lb 4 oz)   SpO2 96%   BMI 29.45 kg/m       Pain Score: 6/10    Is the Patient Confused:  No    Last Food or Drink: 5/23/24 at 1800, food. Drink at 0530 this am aside from sip for med.     Focused Assessment:  LLQ pain. Reports some nausea but declines intervention at this time. Dilaudid given for pain. Diarrhea, no blood noted in stool that patient is aware of.     Patient is alert and oriented, able to make needs known.     Tests Performed: Done: Labs and Imaging    Treatments Provided:  See MAR    Family Dynamics/Concerns: No    Family Updated On Visitor Policy: No    Plan of Care Communicated to Family: Yes    Who Was Updated about Plan of Care: Patient is keeping family updated    Belongings Checklist Done and Signed by Patient: Yes    Belongings Sent with Patient: cell phone, , clothing.    Medications sent with patient: none    Additional Information: call with questions    RN: Krys Liriano RN   5/24/2024 7:10 PM       "

## 2024-05-25 NOTE — PLAN OF CARE
Goal Outcome Evaluation:         Problem: Adult Inpatient Plan of Care  Goal: Plan of Care Review    Outcome: Progressing     Problem: Adult Inpatient Plan of Care  Goal: Patient-Specific Goal (Individualized)    Outcome: Progressing     Problem: Adult Inpatient Plan of Care  Goal: Optimal Comfort and Wellbeing  Outcome: Progressing  Intervention: Monitor Pain and Promote Comfort  Recent Flowsheet Documentation  Taken 5/25/2024 1455 by Letty Huynh RN  Pain Management Interventions:   medication (see MAR)   emotional support  Taken 5/25/2024 1200 by Letty Huynh RN  Pain Management Interventions:   declines   emotional support  Taken 5/25/2024 0900 by Letty Huynh RN  Pain Management Interventions:   medication (see MAR)   emotional support     Problem: Intestinal Obstruction  Goal: Optimal Pain Control and Function  Outcome: Progressing  Intervention: Prevent or Manage Pain  Recent Flowsheet Documentation  Taken 5/25/2024 1455 by Letty Huynh RN  Pain Management Interventions:   medication (see MAR)   emotional support  Taken 5/25/2024 1200 by Letty Huynh RN  Pain Management Interventions:   declines   emotional support  Taken 5/25/2024 0900 by Letty Huynh RN  Pain Management Interventions:   medication (see MAR)   emotional support         Pt is calm and cooperative, rating LLQ pain #7, Dilaudid given PRN with good pain relief, states decreased to #3.  Pt ambulating in room independently, shower taken.  New IV site placed per SWAT nurse.  Diet advanced to clear liquid, well tolerated.      Letty Huynh RN

## 2024-05-25 NOTE — PLAN OF CARE
Problem: Adult Inpatient Plan of Care  Goal: Optimal Comfort and Wellbeing  Outcome: Progressing  Intervention: Monitor Pain and Promote Comfort  Recent Flowsheet Documentation  Taken 5/25/2024 0319 by Zi Robles RN  Pain Management Interventions: medication (see MAR)     Problem: Pain Acute  Goal: Optimal Pain Control and Function  Outcome: Progressing  Intervention: Develop Pain Management Plan  Recent Flowsheet Documentation  Taken 5/25/2024 0319 by Zi Robles RN  Pain Management Interventions: medication (see MAR)  Intervention: Prevent or Manage Pain  Recent Flowsheet Documentation  Taken 5/24/2024 2325 by Zi Robles RN  Medication Review/Management: medications reviewed  Taken 5/24/2024 2028 by Zi Robles RN  Medication Review/Management: medications reviewed     Problem: Infection  Goal: Absence of Infection Signs and Symptoms  Outcome: Progressing   Goal Outcome Evaluation:  A&Ox4. Independent pt. Complained of abdominal pain relieved with PRN dilaudid and rest. PRN zofran given for nauseax1 overnight. LR running at 100 mL/hr. Pt remains NPO with no exceptions. VSS.

## 2024-05-25 NOTE — PROGRESS NOTES
"General Surgery Progress Note:    Hospital Day # 1    ASSESSMENT:   1. Acute diverticulitis        Chase Mcdaniels is a 58 year old male presenting with acute cholecystitis.  The patient has improvement of his symptoms and he has improvement of his leukocytosis.    PLAN:   -Starting patient on clear liquid diet.  We will plan to advance him slowly given that this is his second microperforation from diverticulitis.  -Continue with antibiotic coverage  -Continue medical management per primary team  -Surgery team following with you      SUBJECTIVE:   hCase Mcdaniels was seen on rounds.  Patient states that he is doing a lot better.  Abdominal pain with the pain medication is approximately 1-2 out of 10.  Otherwise, patient has not passed flatus or bowel movements yet.  Patient does have acid reflux.    Patient Vitals for the past 24 hrs:   BP Temp Temp src Pulse Resp SpO2 Height Weight   05/25/24 0743 125/73 98.1  F (36.7  C) Oral 82 20 96 % -- --   05/25/24 0003 119/58 97.8  F (36.6  C) Oral 80 19 94 % -- --   05/24/24 2028 124/66 98.3  F (36.8  C) Oral 75 -- 97 % -- --   05/24/24 1916 124/71 98.6  F (37  C) Oral 84 -- 96 % -- --   05/24/24 1709 -- -- -- -- -- 96 % -- --   05/24/24 1550 124/71 98.2  F (36.8  C) Oral 75 14 96 % -- --   05/24/24 1330 -- -- -- 76 -- 95 % -- --   05/24/24 1308 120/73 97.8  F (36.6  C) Oral 64 17 97 % 1.778 m (5' 10\") 93.1 kg (205 lb 4 oz)   05/24/24 1230 -- -- -- 82 -- 92 % -- --   05/24/24 1015 -- -- -- 82 -- 95 % -- --       Physical Exam:  General: NAD, pleasant  CV:RRR  LUNGS:Normal respiratory effort, no accessory muscle use  ABD: Abdomen soft, obese, mild distention, tender to palpation in the bilateral lower quadrants  EXT:no CCE    Admission on 05/24/2024   Component Date Value    Sodium 05/24/2024 136     Potassium 05/24/2024 3.7     Carbon Dioxide (CO2) 05/24/2024 23     Anion Gap 05/24/2024 13     Urea Nitrogen 05/24/2024 19.9     Creatinine 05/24/2024 1.12     GFR Estimate " 05/24/2024 77     Calcium 05/24/2024 9.8     Chloride 05/24/2024 100     Glucose 05/24/2024 170 (H)     Alkaline Phosphatase 05/24/2024 99     AST 05/24/2024 30     ALT 05/24/2024 37     Protein Total 05/24/2024 7.6     Albumin 05/24/2024 4.6     Bilirubin Total 05/24/2024 1.3 (H)     WBC Count 05/24/2024 19.9 (H)     RBC Count 05/24/2024 5.33     Hemoglobin 05/24/2024 16.4     Hematocrit 05/24/2024 45.7     MCV 05/24/2024 86     MCH 05/24/2024 30.8     MCHC 05/24/2024 35.9     RDW 05/24/2024 11.8     Platelet Count 05/24/2024 273     % Neutrophils 05/24/2024 91     % Lymphocytes 05/24/2024 3     % Monocytes 05/24/2024 6     % Eosinophils 05/24/2024 0     % Basophils 05/24/2024 0     % Immature Granulocytes 05/24/2024 0     NRBCs per 100 WBC 05/24/2024 0     Absolute Neutrophils 05/24/2024 18.0 (H)     Absolute Lymphocytes 05/24/2024 0.6 (L)     Absolute Monocytes 05/24/2024 1.2     Absolute Eosinophils 05/24/2024 0.0     Absolute Basophils 05/24/2024 0.0     Absolute Immature Granul* 05/24/2024 0.1     Absolute NRBCs 05/24/2024 0.0     Hold Specimen 05/24/2024 JIC     Hold Specimen 05/24/2024 [facility]     Color Urine 05/24/2024 Colorless     Appearance Urine 05/24/2024 Clear     Glucose Urine 05/24/2024 Negative     Bilirubin Urine 05/24/2024 Negative     Ketones Urine 05/24/2024 Negative     Specific Gravity Urine 05/24/2024 1.005     Blood Urine 05/24/2024 Negative     pH Urine 05/24/2024 6.0     Protein Albumin Urine 05/24/2024 Negative     Urobilinogen Urine 05/24/2024 <2.0     Nitrite Urine 05/24/2024 Negative     Leukocyte Esterase Urine 05/24/2024 Negative     RBC Urine 05/24/2024 0     WBC Urine 05/24/2024 0     Lactic Acid, Initial 05/24/2024 0.9     Culture 05/24/2024 No growth after 12 hours     Culture 05/24/2024 No growth after 12 hours     WBC Count 05/25/2024 10.2     RBC Count 05/25/2024 4.21 (L)     Hemoglobin 05/25/2024 12.8 (L)     Hematocrit 05/25/2024 37.7 (L)     MCV 05/25/2024 90     MCH  05/25/2024 30.4     MCHC 05/25/2024 34.0     RDW 05/25/2024 12.1     Platelet Count 05/25/2024 145 (L)     Hold Specimen 05/25/2024 DO Vikram Stovall DO  General Surgeon  [facility]  Surgery [address] [address]?  Office: [phone]  Employed by - [facility] [employer] [facility] [employer]  Pager: [phone]

## 2024-05-26 PROCEDURE — 250N000013 HC RX MED GY IP 250 OP 250 PS 637: Performed by: FAMILY MEDICINE

## 2024-05-26 PROCEDURE — 99232 SBSQ HOSP IP/OBS MODERATE 35: CPT | Performed by: INTERNAL MEDICINE

## 2024-05-26 PROCEDURE — 250N000011 HC RX IP 250 OP 636: Performed by: FAMILY MEDICINE

## 2024-05-26 PROCEDURE — 120N000001 HC R&B MED SURG/OB

## 2024-05-26 PROCEDURE — C9113 INJ PANTOPRAZOLE SODIUM, VIA: HCPCS | Performed by: INTERNAL MEDICINE

## 2024-05-26 PROCEDURE — 99231 SBSQ HOSP IP/OBS SF/LOW 25: CPT | Performed by: SURGERY

## 2024-05-26 PROCEDURE — 250N000011 HC RX IP 250 OP 636: Performed by: INTERNAL MEDICINE

## 2024-05-26 RX ADMIN — PANTOPRAZOLE SODIUM 40 MG: 40 INJECTION, POWDER, FOR SOLUTION INTRAVENOUS at 09:47

## 2024-05-26 RX ADMIN — ENOXAPARIN SODIUM 40 MG: 40 INJECTION SUBCUTANEOUS at 15:08

## 2024-05-26 RX ADMIN — PIPERACILLIN AND TAZOBACTAM 3.38 G: 3; .375 INJECTION, POWDER, FOR SOLUTION INTRAVENOUS at 15:08

## 2024-05-26 RX ADMIN — Medication 5 MG: at 22:58

## 2024-05-26 RX ADMIN — PIPERACILLIN AND TAZOBACTAM 3.38 G: 3; .375 INJECTION, POWDER, FOR SOLUTION INTRAVENOUS at 22:58

## 2024-05-26 RX ADMIN — CALCIUM CARBONATE (ANTACID) CHEW TAB 500 MG 1000 MG: 500 CHEW TAB at 18:02

## 2024-05-26 RX ADMIN — ONDANSETRON 4 MG: 4 TABLET, ORALLY DISINTEGRATING ORAL at 11:36

## 2024-05-26 RX ADMIN — HYDROMORPHONE HYDROCHLORIDE 0.2 MG: 1 INJECTION, SOLUTION INTRAMUSCULAR; INTRAVENOUS; SUBCUTANEOUS at 22:58

## 2024-05-26 RX ADMIN — HYDROMORPHONE HYDROCHLORIDE 0.4 MG: 1 INJECTION, SOLUTION INTRAMUSCULAR; INTRAVENOUS; SUBCUTANEOUS at 11:29

## 2024-05-26 RX ADMIN — DOCUSATE SODIUM 100 MG: 100 CAPSULE, LIQUID FILLED ORAL at 09:47

## 2024-05-26 RX ADMIN — PIPERACILLIN AND TAZOBACTAM 3.38 G: 3; .375 INJECTION, POWDER, FOR SOLUTION INTRAVENOUS at 05:33

## 2024-05-26 RX ADMIN — HYDROMORPHONE HYDROCHLORIDE 0.4 MG: 1 INJECTION, SOLUTION INTRAMUSCULAR; INTRAVENOUS; SUBCUTANEOUS at 18:02

## 2024-05-26 ASSESSMENT — ACTIVITIES OF DAILY LIVING (ADL)
ADLS_ACUITY_SCORE: 20

## 2024-05-26 NOTE — PLAN OF CARE
Goal Outcome Evaluation:         Problem: Adult Inpatient Plan of Care  Goal: Optimal Comfort and Wellbeing  Outcome: Progressing  Intervention: Monitor Pain and Promote Comfort  Recent Flowsheet Documentation  Taken 5/26/2024 1129 by Letty Huynh RN  Pain Management Interventions:   medication (see MAR)   emotional support     Problem: Pain Acute  Goal: Optimal Pain Control and Function  Outcome: Progressing  Intervention: Develop Pain Management Plan  Recent Flowsheet Documentation  Taken 5/26/2024 1129 by Letty Huynh RN  Pain Management Interventions:   medication (see MAR)   emotional support  Intervention: Prevent or Manage Pain  Recent Flowsheet Documentation  Taken 5/26/2024 0930 by Letty Huynh RN  Medication Review/Management: medications reviewed     Problem: Infection  Goal: Absence of Infection Signs and Symptoms  Outcome: Progressing         Pt is calm and cooperative, rating abdominal pain #7, Dilaudid given with good relief, states pain decreased to #3.  Pt c/o nausea, Zofran PO given with good relief.  Loose BM X1.  Pt ambulating independently, shower taken.  Pt tolerated low fiber diet.  SL X2 patent.  Pt hopeful to discharge to home tomorrow.    Letty Huynh RN                 No adenopathy or splenomegaly. No cervical or inguinal lymphadenopathy.

## 2024-05-26 NOTE — PROGRESS NOTES
"General Surgery Progress Note    Subjective  No acute events overnight.  Patient continues to feel better with each day.  As expected left lower quadrant soreness but much improved from previous.  He is tolerating clear liquid diet.  Has had 2 bowel movements.    Objective  /69 (BP Location: Right arm)   Pulse 74   Temp 98.8  F (37.1  C) (Oral)   Resp 18   Ht 1.778 m (5' 10\")   Wt 93.1 kg (205 lb 4 oz)   SpO2 95%   BMI 29.45 kg/m      Sitting up in bed no acute distress  Nonlabored breathing on room air  Regular rate and rhythm  Abdomen soft and nondistended.  Focally tender in left lower quadrant.    I/O last 3 completed shifts:  In: 1530 [P.O.:1530]  Out: -     No new labs today    Assessment and plan  Patient is a 58-year-old man admitted with diverticulitis with microperforation.  He is improving with antibiotics.      -Low fiber diet today  -Continue IV antibiotics  -Transition to p.o. antibiotics tomorrow if continues to do well and likely discharge tomorrow    Olegario Valle MD  General Surgeon  [facility]  Surgery Clinic - [address]  [place] [address]  Office: [phone]    "

## 2024-05-26 NOTE — PROGRESS NOTES
CLINICAL NUTRITION SERVICES NOTE    Per Nutrition screen pt eating poorly with weight loss.  Per chart review:  Current weight of 205 lb same as last year at this time.  Pt did have weight of 209 lb and 205 lb on day of admit.  Pt had one day of abdominal pain, nausea, vomiting.  Started feeling weird at  restaurant. Has history of diverticulitis.  Today is Day 2 of admission, pt has a good appetite and is eating 100%  low fiber meals today.  Having bowel movements.  Likely discharge tomorrow.  Will follow peripherally, unless consulted.

## 2024-05-26 NOTE — PLAN OF CARE
Goal Outcome Evaluation:      Plan of Care Reviewed With: patient    Overall Patient Progress: improvingOverall Patient Progress: improving         Pt alert, oriented, independent, and pleasant. Endorses LLQ pain, PRN 0.4 mg IV Dilaudid given x1. Tolerating clear liquid diet. Bowel sounds hypoactive, pt endorses passing increased amount of flatus. IV abx given.     Call light within reach, pt able to make needs known.

## 2024-05-26 NOTE — PROGRESS NOTES
"[facility]    Medicine Progress Note - Hospitalist Service    Date of Admission:  5/24/2024    Assessment & Plan   Chase Mcdaniels is a 58 year old male admitted on 5/24/2024 with recurrent diverticulitis with microperforation and abscess    Complicated diverticulitis with microperforation and abscess  --- Second episode.  Previous episode 5/2023 treated as outpatient and had microperforation at that time  --- Acute onset symptoms one day PTA  --- CT with microperforation as well as 2 cm intermural abscess in sigmoid colon too small to drain  --- Admit, continue n.p.o. status  --- Continue Zosyn  --- Surgery consult, second episode: On clear liquid. Defer to surgeon to advance diet  --- Fluids and IV pain control  --- Blood culture and lactic acid requested.  --- Trend WBC down to normal  -feeling better, tolerating clear liquid    Acid reflux  -iv ppi          Diet: Low Fiber Diet    DVT Prophylaxis: Pneumatic Compression Devices  Frazier Catheter: Not present  Lines: None     Cardiac Monitoring: None  Code Status: Full Code      Clinically Significant Risk Factors                         # Overweight: Estimated body mass index is 29.45 kg/m  as calculated from the following:    Height as of this encounter: 1.778 m (5' 10\").    Weight as of this encounter: 93.1 kg (205 lb 4 oz)., PRESENT ON ADMISSION            Disposition Plan     Medically Ready for Discharge: Anticipated Tomorrow    Barrier: defer to surgeon         Renea Trinidad MD  Hospitalist Service  [facility]  Securely message with [employer] (more info)  Text page via [employer] Paging/Directory   ______________________________________________________________________    Interval History   Tolerating clear liquid diet, less pain, no n/v, no f/c    Physical Exam   Vital Signs: Temp: 98.8  F (37.1  C) Temp src: Oral BP: 124/72 Pulse: 78   Resp: 16 SpO2: 96 % O2 Device: None (Room air)    Weight: 205 lbs 3.97 " oz    General.  Awake alert oriented not in acute distress.  HEENT.  Pupils equal round react to light, anicteric, EOM intact.  Neck supple no JVD.  CVS regular rhythm no murmur gallops.  Lungs.  Clear to auscultation bilateral no wheezing or rales.  Abdomen.  Soft mild diffuse tender bowel sounds present.  Extremities.  No edema no calf tenderness.  Neurological.  Awake and alert. No focal deficit.  Skin no rash. No pallor.  Psych. Normal mood.      Medical Decision Making       46 MINUTES SPENT BY ME on the date of service doing chart review, history, exam, documentation & further activities per the note.      Data         Imaging results reviewed over the past 24 hrs:   No results found for this or any previous visit (from the past 24 hour(s)).

## 2024-05-26 NOTE — PLAN OF CARE
Problem: Adult Inpatient Plan of Care  Goal: Optimal Comfort and Wellbeing  Outcome: Progressing  Intervention: Monitor Pain and Promote Comfort  Recent Flowsheet Documentation  Taken 5/26/2024 0007 by Zi Robles RN  Pain Management Interventions: rest  Taken 5/25/2024 2352 by Zi Robles RN  Pain Management Interventions:   medication (see MAR)   rest     Problem: Pain Acute  Goal: Optimal Pain Control and Function  Outcome: Progressing  Intervention: Develop Pain Management Plan  Recent Flowsheet Documentation  Taken 5/26/2024 0007 by Zi Robles RN  Pain Management Interventions: rest  Taken 5/25/2024 2352 by Zi Robles RN  Pain Management Interventions:   medication (see MAR)   rest  Intervention: Prevent or Manage Pain  Recent Flowsheet Documentation  Taken 5/25/2024 2352 by Zi Robles RN  Medication Review/Management: medications reviewed     Problem: Infection  Goal: Absence of Infection Signs and Symptoms  Outcome: Progressing   Goal Outcome Evaluation:  A&Ox4. Independent in room. Complained of abdominal pain relieved with PRN dilaudid. BS are hypoactive. Tolerating clears. VSS.

## 2024-05-27 VITALS
TEMPERATURE: 97.8 F | SYSTOLIC BLOOD PRESSURE: 117 MMHG | OXYGEN SATURATION: 95 % | BODY MASS INDEX: 29.38 KG/M2 | WEIGHT: 205.25 LBS | HEART RATE: 63 BPM | HEIGHT: 70 IN | RESPIRATION RATE: 18 BRPM | DIASTOLIC BLOOD PRESSURE: 75 MMHG

## 2024-05-27 LAB
CREAT SERPL-MCNC: 1.05 MG/DL (ref 0.67–1.17)
EGFRCR SERPLBLD CKD-EPI 2021: 83 ML/MIN/1.73M2
PLATELET # BLD AUTO: 191 10E3/UL (ref 150–450)

## 2024-05-27 PROCEDURE — 250N000013 HC RX MED GY IP 250 OP 250 PS 637: Performed by: NURSE PRACTITIONER

## 2024-05-27 PROCEDURE — 85049 AUTOMATED PLATELET COUNT: CPT | Performed by: FAMILY MEDICINE

## 2024-05-27 PROCEDURE — 250N000011 HC RX IP 250 OP 636: Performed by: FAMILY MEDICINE

## 2024-05-27 PROCEDURE — C9113 INJ PANTOPRAZOLE SODIUM, VIA: HCPCS | Performed by: INTERNAL MEDICINE

## 2024-05-27 PROCEDURE — 250N000011 HC RX IP 250 OP 636: Performed by: INTERNAL MEDICINE

## 2024-05-27 PROCEDURE — 99221 1ST HOSP IP/OBS SF/LOW 40: CPT | Performed by: NURSE PRACTITIONER

## 2024-05-27 PROCEDURE — 99239 HOSP IP/OBS DSCHRG MGMT >30: CPT | Performed by: INTERNAL MEDICINE

## 2024-05-27 PROCEDURE — 36415 COLL VENOUS BLD VENIPUNCTURE: CPT | Performed by: FAMILY MEDICINE

## 2024-05-27 PROCEDURE — 82565 ASSAY OF CREATININE: CPT | Performed by: FAMILY MEDICINE

## 2024-05-27 PROCEDURE — 250N000013 HC RX MED GY IP 250 OP 250 PS 637: Performed by: FAMILY MEDICINE

## 2024-05-27 RX ORDER — METRONIDAZOLE 500 MG/1
500 TABLET ORAL 3 TIMES DAILY
Qty: 36 TABLET | Refills: 0 | Status: SHIPPED | OUTPATIENT
Start: 2024-05-27 | End: 2024-06-08

## 2024-05-27 RX ORDER — CIPROFLOXACIN 500 MG/1
500 TABLET, FILM COATED ORAL EVERY 12 HOURS SCHEDULED
Status: DISCONTINUED | OUTPATIENT
Start: 2024-05-27 | End: 2024-05-27 | Stop reason: HOSPADM

## 2024-05-27 RX ORDER — CIPROFLOXACIN 500 MG/1
500 TABLET, FILM COATED ORAL 2 TIMES DAILY
Qty: 24 TABLET | Refills: 0 | Status: SHIPPED | OUTPATIENT
Start: 2024-05-27 | End: 2024-06-08

## 2024-05-27 RX ORDER — ACETAMINOPHEN 325 MG/1
650 TABLET ORAL EVERY 4 HOURS PRN
COMMUNITY
Start: 2024-05-27 | End: 2024-06-26

## 2024-05-27 RX ORDER — DOCUSATE SODIUM 100 MG/1
100 CAPSULE, LIQUID FILLED ORAL 2 TIMES DAILY
COMMUNITY
Start: 2024-05-27 | End: 2024-06-26

## 2024-05-27 RX ORDER — METRONIDAZOLE 500 MG/1
500 TABLET ORAL 3 TIMES DAILY
Status: DISCONTINUED | OUTPATIENT
Start: 2024-05-27 | End: 2024-05-27 | Stop reason: HOSPADM

## 2024-05-27 RX ORDER — ONDANSETRON 4 MG/1
4 TABLET, ORALLY DISINTEGRATING ORAL EVERY 6 HOURS PRN
Qty: 15 TABLET | Refills: 0 | Status: SHIPPED | OUTPATIENT
Start: 2024-05-27 | End: 2024-06-26

## 2024-05-27 RX ADMIN — PANTOPRAZOLE SODIUM 40 MG: 40 INJECTION, POWDER, FOR SOLUTION INTRAVENOUS at 08:27

## 2024-05-27 RX ADMIN — PIPERACILLIN AND TAZOBACTAM 3.38 G: 3; .375 INJECTION, POWDER, FOR SOLUTION INTRAVENOUS at 06:50

## 2024-05-27 RX ADMIN — METRONIDAZOLE 500 MG: 500 TABLET ORAL at 11:19

## 2024-05-27 RX ADMIN — DOCUSATE SODIUM 100 MG: 100 CAPSULE, LIQUID FILLED ORAL at 08:27

## 2024-05-27 RX ADMIN — CIPROFLOXACIN HYDROCHLORIDE 500 MG: 500 TABLET, FILM COATED ORAL at 11:19

## 2024-05-27 ASSESSMENT — ACTIVITIES OF DAILY LIVING (ADL)
ADLS_ACUITY_SCORE: 20

## 2024-05-27 NOTE — DISCHARGE SUMMARY
[facility]    Discharge Summary  Hospitalist    Date of Admission:  5/24/2024  Date of Discharge:  5/27/2024  Discharging Provider: Renea Trinidad MD  Date of Service (when I saw the patient): 05/27/24    Discharge Diagnoses   Complicated diverticulitis with microperforation and abscess    History of Present Illness   Chase Mcdaniels is an 58 year old male who presented with abdominal pain    Hospital Course   Chase Mcdaniels is a 58 year old male admitted on 5/24/2024 with recurrent diverticulitis with microperforation and abscess     Complicated diverticulitis with microperforation and abscess  --- Second episode.  Previous episode 5/2023 treated as outpatient and had microperforation at that time  --- Acute onset symptoms one day PTA  --- CT with microperforation as well as 2 cm intermural abscess in sigmoid colon too small to drain  --- Admit,  with n.p.o. status  --- IV Zosyn in hospital   --- Surgery consult, second episode: Surgeon to advance diet  --- Fluids and IV pain control  --- Blood culture and lactic acid requested.  --- Trend WBC down to normal  -feeling better, tolerating diet  -will discharge on Cipro and Flagyl to complete 14 days of antibiotics   -Low fiber diet fir a couple weeks and then back to a regular diet  -Will need colonoscopy in 6 weeks    Acid reflux  -ppi    Renea Trinidad MD    Significant Results and Procedures   See below    Pending Results   These results will be followed up by pcp  Unresulted Labs Ordered in the Past 30 Days of this Admission       Date and Time Order Name Status Description    5/24/2024  9:08 AM Blood Culture Peripheral Blood Preliminary     5/24/2024  9:08 AM Blood Culture Peripheral Blood Preliminary             Code Status   Full Code       Primary Care Physician   Physician No Ref-Primary    General.  Awake alert oriented not in acute distress.  HEENT.  Pupils equal round react to light, anicteric, EOM intact.  Neck supple no  JVD.  CVS regular rhythm no murmur gallops.  Lungs.  Clear to auscultation bilateral no wheezing or rales.  Abdomen.  Soft mild left tenderness+ bowel sounds present.  Extremities.  No edema no calf tenderness.  Neurological.  Awake and alert. No focal deficit.  Skin no rash. No pallor.  Psych. Normal mood.      Discharge Disposition   Discharged to home  Condition at discharge: Good    Consultations This Hospital Stay   SURGERY GENERAL IP CONSULT    Time Spent on this Encounter   I, Renea Trinidad MD, personally saw the patient today and spent greater than 30 minutes discharging this patient.    Discharge Orders      Reason for your hospital stay    Acute diverticulitis   Intra-abdominal abscess     Follow-up and recommended labs and tests     Follow up with primary care provider, Physician No Ref-Primary, within 7 days to evaluate medication change, for hospital follow- up, and regarding new diagnosis.  The following labs/tests are recommended: cbc, bmp.     Activity    Your activity upon discharge: activity as tolerated     When to contact your care team    Call your primary doctor if you have any of the following: increased pain or fever or any concerns.     Diet    Follow this diet upon discharge: Orders Placed This Encounter      Low Fiber Diet     Discharge Medications   Current Discharge Medication List        START taking these medications    Details   acetaminophen (TYLENOL) 325 MG tablet Take 2 tablets (650 mg) by mouth every 4 hours as needed for mild pain or other (and adjunct with moderate or severe pain or per patient request)    Associated Diagnoses: Acute diverticulitis      ciprofloxacin (CIPRO) 500 MG tablet Take 1 tablet (500 mg) by mouth 2 times daily for 12 days  Qty: 24 tablet, Refills: 0    Associated Diagnoses: Acute diverticulitis      docusate sodium (COLACE) 100 MG capsule Take 1 capsule (100 mg) by mouth 2 times daily    Associated Diagnoses: Acute diverticulitis      metroNIDAZOLE  (FLAGYL) 500 MG tablet Take 1 tablet (500 mg) by mouth 3 times daily for 12 days  Qty: 36 tablet, Refills: 0    Associated Diagnoses: Acute diverticulitis      ondansetron (ZOFRAN ODT) 4 MG ODT tab Take 1 tablet (4 mg) by mouth every 6 hours as needed for nausea or vomiting  Qty: 15 tablet, Refills: 0    Associated Diagnoses: Acute diverticulitis           STOP taking these medications       naproxen sodium (ANAPROX) 220 MG tablet Comments:   Reason for Stopping:             Allergies   No Known Allergies  Data   Most Recent 3 CBC's:  Recent Labs   Lab Test 05/27/24  0724 05/25/24  0550 05/24/24  0648   WBC  --  10.2 19.9*   HGB  --  12.8* 16.4   MCV  --  90 86    145* 273      Most Recent 3 BMP's:  Recent Labs   Lab Test 05/27/24  0724 05/24/24  0648   NA  --  136   POTASSIUM  --  3.7   CHLORIDE  --  100   CO2  --  23   BUN  --  19.9   CR 1.05 1.12   ANIONGAP  --  13   MARIA GUADALUPE  --  9.8   GLC  --  170*     Most Recent 2 LFT's:  Recent Labs   Lab Test 05/24/24  0648   AST 30   ALT 37   ALKPHOS 99   BILITOTAL 1.3*     Most Recent INR's and Anticoagulation Dosing History:  Anticoagulation Dose History           No data to display              Most Recent 3 Troponin's:No lab results found.  Most Recent Cholesterol Panel:No lab results found.  Most Recent 6 Bacteria Isolates From Any Culture (See EPIC Reports for Culture Details):No lab results found.  Most Recent TSH, T4 and A1c Labs:No lab results found.  Results for orders placed or performed during the hospital encounter of 05/24/24   CT Abdomen Pelvis w Contrast    Narrative    EXAM: CT ABDOMEN PELVIS W CONTRAST  LOCATION: [facility]  DATE: 5/24/2024    INDICATION: LLQ pain  COMPARISON: 5/17/2023  TECHNIQUE: CT scan of the abdomen and pelvis was performed following injection of IV contrast. Multiplanar reformats were obtained. Dose reduction techniques were used.  CONTRAST: 90 mL Isovue-370    FINDINGS:   LOWER CHEST:  Normal.    HEPATOBILIARY: Hepatic steatosis. Hepatomegaly measuring 20.9 cm in craniocaudal dimension.    PANCREAS: Normal.    SPLEEN: Stable splenomegaly.    ADRENAL GLANDS: Normal.    KIDNEYS/BLADDER: Normal kidneys. Mild superior bladder wall thickening which is likely reactive.    BOWEL: Inflammatory changes around the sigmoid colon are similar to prior and consistent with acute diverticulitis. A few small foci of adjacent extraluminal air are consistent with microperforation. No remote pneumoperitoneum. A thick-walled 2.0 x 1.5   cm fluid collection in the wall of the sigmoid colon is consistent with intramural abscess, image 185:3. No obstruction. Normal appendix.    LYMPH NODES: Normal.    VASCULATURE: No abdominal aortic aneurysm.    PELVIC ORGANS: Trace free fluid in the pelvis. Bilateral inguinal herniorrhaphy. Stable small fat-containing left inguinal hernia.    MUSCULOSKELETAL: Degenerative changes.      Impression    IMPRESSION:   1.  Acute sigmoid diverticulitis with microperforation.  2.  Interval development of a 2.0 cm intramural abscess in the sigmoid colon. This is too small for percutaneous drainage.  3.  Hepatomegaly with hepatic steatosis.  4.  Splenomegaly.

## 2024-05-27 NOTE — PLAN OF CARE
Problem: Adult Inpatient Plan of Care  Goal: Optimal Comfort and Wellbeing  Outcome: Progressing     Problem: Pain Acute  Goal: Optimal Pain Control and Function  Outcome: Progressing  Intervention: Prevent or Manage Pain  Recent Flowsheet Documentation  Taken 5/27/2024 0100 by Phoenix Galvin RN  Medication Review/Management: medications reviewed   Goal Outcome Evaluation:  Pt is A/O, complaint of abdominal pain, PRN dilaudid was administered. Pt ambulates independently to the bathroom, he is continent of BB. Pt requested melatonin for sleep at bedtime, melatonin was administered. O2 Sats were WNL.

## 2024-05-27 NOTE — PROGRESS NOTES
General Surgery Progress Note:    Hospital Day # 3    ASSESSMENT:   1. Acute diverticulitis        Chase Mcdaniels is a 58 year old male admitted with diverticulitis with microperforation and intramural abscess. He is doing well. Transition to oral antibiotics and discharge home    PLAN:   Disposition per [facility]  Cipro/flagyl to complete a 2 week total course of antibiotics  Low fiber diet fir a couple weeks and then back to a regular diet  Will need colonoscopy in 6 weeks  Surgery will sign off    SUBJECTIVE:   Chase Mcdaniels is doing well. He is tolerating a low fiber diet and is having non-bloody Bms. No pain    Patient Vitals for the past 24 hrs:   BP Temp Temp src Pulse Resp SpO2   05/27/24 0724 117/75 97.8  F (36.6  C) Oral 63 18 95 %   05/26/24 2355 106/55 97.7  F (36.5  C) Oral 62 17 95 %   05/26/24 1550 122/68 98.5  F (36.9  C) Oral 80 17 97 %       Physical Exam:  General: NAD, pleasant  CV:RRR  LUNGS:CTA bilaterally  ABD: Soft, not tender  EXT:no CCE    Admission on 05/24/2024   Component Date Value    Sodium 05/24/2024 136     Potassium 05/24/2024 3.7     Carbon Dioxide (CO2) 05/24/2024 23     Anion Gap 05/24/2024 13     Urea Nitrogen 05/24/2024 19.9     Creatinine 05/24/2024 1.12     GFR Estimate 05/24/2024 77     Calcium 05/24/2024 9.8     Chloride 05/24/2024 100     Glucose 05/24/2024 170 (H)     Alkaline Phosphatase 05/24/2024 99     AST 05/24/2024 30     ALT 05/24/2024 37     Protein Total 05/24/2024 7.6     Albumin 05/24/2024 4.6     Bilirubin Total 05/24/2024 1.3 (H)     WBC Count 05/24/2024 19.9 (H)     RBC Count 05/24/2024 5.33     Hemoglobin 05/24/2024 16.4     Hematocrit 05/24/2024 45.7     MCV 05/24/2024 86     MCH 05/24/2024 30.8     MCHC 05/24/2024 35.9     RDW 05/24/2024 11.8     Platelet Count 05/24/2024 273     % Neutrophils 05/24/2024 91     % Lymphocytes 05/24/2024 3     % Monocytes 05/24/2024 6     % Eosinophils 05/24/2024 0     % Basophils 05/24/2024 0     % Immature Granulocytes  05/24/2024 0     NRBCs per 100 WBC 05/24/2024 0     Absolute Neutrophils 05/24/2024 18.0 (H)     Absolute Lymphocytes 05/24/2024 0.6 (L)     Absolute Monocytes 05/24/2024 1.2     Absolute Eosinophils 05/24/2024 0.0     Absolute Basophils 05/24/2024 0.0     Absolute Immature Granul* 05/24/2024 0.1     Absolute NRBCs 05/24/2024 0.0     Hold Specimen 05/24/2024 JIC     Hold Specimen 05/24/2024 JIC     Color Urine 05/24/2024 Colorless     Appearance Urine 05/24/2024 Clear     Glucose Urine 05/24/2024 Negative     Bilirubin Urine 05/24/2024 Negative     Ketones Urine 05/24/2024 Negative     Specific Gravity Urine 05/24/2024 1.005     Blood Urine 05/24/2024 Negative     pH Urine 05/24/2024 6.0     Protein Albumin Urine 05/24/2024 Negative     Urobilinogen Urine 05/24/2024 <2.0     Nitrite Urine 05/24/2024 Negative     Leukocyte Esterase Urine 05/24/2024 Negative     RBC Urine 05/24/2024 0     WBC Urine 05/24/2024 0     Lactic Acid, Initial 05/24/2024 0.9     Culture 05/24/2024 No growth after 2 days     Culture 05/24/2024 No growth after 2 days     WBC Count 05/25/2024 10.2     RBC Count 05/25/2024 4.21 (L)     Hemoglobin 05/25/2024 12.8 (L)     Hematocrit 05/25/2024 37.7 (L)     MCV 05/25/2024 90     MCH 05/25/2024 30.4     MCHC 05/25/2024 34.0     RDW 05/25/2024 12.1     Platelet Count 05/25/2024 145 (L)     Hold Specimen 05/25/2024 [facility]     Platelet Count 05/27/2024 191     Creatinine 05/27/2024 1.05     GFR Estimate 05/27/2024 83         Diya Aleman, APRN CNP

## 2024-05-27 NOTE — PROGRESS NOTES
Care Management Discharge Note    Discharge Date: 05/27/2024    Discharge Disposition: Home    Discharge Services: None    Discharge DME: None    Discharge Transportation: Family/Friend    Private pay costs discussed: Not applicable    Does the patient's insurance plan have a 3 day qualifying hospital stay waiver?  Yes     Which insurance plan 3 day waiver is available? Alternative insurance waiver    Will the waiver be used for post-acute placement? No    PAS Confirmation Code: N/A  Patient/family educated on Medicare website which has current facility and service quality ratings: N/A    Education Provided on the Discharge Plan: Per Team  Persons Notified of Discharge Plans: Patient  Patient/Family in Agreement with the Plan: Yes    Handoff Referral Completed: No    Additional Information:  SWCM reviewed updates. Pt will discharge home, no CM needs identified. Family transport.    RANDAL Fabian

## 2024-05-27 NOTE — PLAN OF CARE
Goal Outcome Evaluation:      Plan of Care Reviewed With: patient    Overall Patient Progress: improvingOverall Patient Progress: improving         [id number].    Endorses abd pain, PRN Tums and 0.4 mg IV dilaudid given with effect. Call light within reach, able to make needs known.

## 2024-05-29 LAB
BACTERIA BLD CULT: NO GROWTH
BACTERIA BLD CULT: NO GROWTH

## 2024-06-26 ENCOUNTER — OFFICE VISIT (OUTPATIENT)
Dept: FAMILY MEDICINE | Facility: CLINIC | Age: 59
End: 2024-06-26
Payer: COMMERCIAL

## 2024-06-26 VITALS
TEMPERATURE: 98.6 F | RESPIRATION RATE: 20 BRPM | DIASTOLIC BLOOD PRESSURE: 71 MMHG | SYSTOLIC BLOOD PRESSURE: 127 MMHG | OXYGEN SATURATION: 98 % | HEART RATE: 75 BPM

## 2024-06-26 DIAGNOSIS — L02.413 ABSCESS OF RIGHT ARM: Primary | ICD-10-CM

## 2024-06-26 PROBLEM — D12.6 ADENOMATOUS COLON POLYP: Status: ACTIVE | Noted: 2020-05-07

## 2024-06-26 PROBLEM — E78.1 HYPERTRIGLYCERIDEMIA: Status: ACTIVE | Noted: 2017-12-01

## 2024-06-26 PROBLEM — K52.9 CHRONIC DIARRHEA: Status: ACTIVE | Noted: 2019-04-05

## 2024-06-26 PROBLEM — M10.9 GOUT: Status: ACTIVE | Noted: 2019-04-05

## 2024-06-26 PROCEDURE — 87077 CULTURE AEROBIC IDENTIFY: CPT | Performed by: STUDENT IN AN ORGANIZED HEALTH CARE EDUCATION/TRAINING PROGRAM

## 2024-06-26 PROCEDURE — 10060 I&D ABSCESS SIMPLE/SINGLE: CPT | Performed by: STUDENT IN AN ORGANIZED HEALTH CARE EDUCATION/TRAINING PROGRAM

## 2024-06-26 PROCEDURE — 87186 SC STD MICRODIL/AGAR DIL: CPT | Performed by: STUDENT IN AN ORGANIZED HEALTH CARE EDUCATION/TRAINING PROGRAM

## 2024-06-26 PROCEDURE — 87070 CULTURE OTHR SPECIMN AEROBIC: CPT | Performed by: STUDENT IN AN ORGANIZED HEALTH CARE EDUCATION/TRAINING PROGRAM

## 2024-06-26 RX ORDER — AZITHROMYCIN 250 MG/1
TABLET, FILM COATED ORAL
COMMUNITY
Start: 2024-01-13 | End: 2024-06-26

## 2024-06-26 RX ORDER — CEPHALEXIN 500 MG/1
500 CAPSULE ORAL 4 TIMES DAILY
Qty: 20 CAPSULE | Refills: 0 | Status: SHIPPED | OUTPATIENT
Start: 2024-06-26 | End: 2024-07-01

## 2024-06-26 NOTE — PATIENT INSTRUCTIONS
1 suture to be removed in 7 days  Please take the entire course of antibiotics. If you develop a fever, new swelling in your axillae, pus drainage, please return for evaluation   Caller: patient  Call back number: [phone]  Fax number: [phone]    patient called asking for a letter of medical necessity to be excused from TACT 2 physical restraints   Please fax

## 2024-06-26 NOTE — PROGRESS NOTES
Assessment & Plan     Abscess of right arm  - cephALEXin (KEFLEX) 500 MG capsule  Dispense: 20 capsule; Refill: 0  - Abscess Aerobic Bacterial Culture Routine Without Gram Stain    30mm fluctuant abscess R inner upper arm with surrounding erythema. No axillary lymphadenopathy or fever. Is sterilized, injected with 4cc Lidocaine with Epinephrine, and used an 11 blade to make a 3cm vertical incision to drain ~3cc yellow pus. Cultured. Closed incision with 1 suture 4-0 Vicryl, steristrips, gauze and Coban for pressure dressing. Provided wound care instructions and return precautions. Bleeding was controlled and Darnell was understanding of the plan at the time of discharge.    Return in about 1 week (around 7/3/2024) for Suture Removal.    Hanh Beckham, DO  she/her  [facility]    Subjective     Chase Mcdaniels is a 58 year old male who presents to clinic today for the following health issues:    HPI    3 weeks of ingrown hair under left arm, stable at this point  Painful and warm to touch  Has not had boil to this extent before but has had infections inside his armpit that goes away on its own  Tried to squeeze it and can't get anything out  Has used warm compresses  No tingling or numbness down the arm  No fever after acute diverticulitis infection last month (was hospital)  No h/o MRSA risk factors    No past medical history on file.    No Known Allergies  Current Outpatient Medications   Medication Sig Dispense Refill    cephALEXin (KEFLEX) 500 MG capsule Take 1 capsule (500 mg) by mouth 4 times daily for 5 days 20 capsule 0     No current facility-administered medications for this visit.      Review of Systems  Constitutional, HEENT, cardiovascular, pulmonary, gi and gu systems are negative, except as otherwise noted.      Objective    /71   Pulse 75   Temp 98.6  F (37  C) (Tympanic)   Resp 20   SpO2 98%     Physical Exam  Vitals reviewed.   Constitutional:       Appearance: Normal  appearance.   Cardiovascular:      Rate and Rhythm: Normal rate.   Pulmonary:      Effort: Pulmonary effort is normal.   Abdominal:      General: Abdomen is flat.   Lymphadenopathy:      Upper Body:      Right upper body: No axillary adenopathy.   Skin:     Findings: Abscess (30mm fluctuant, warm abscess with surrounding erythema) present.          Neurological:      Mental Status: He is alert.            No results found for any visits on 06/26/24.        The use of Dragon/WeMontageation services may have been used to construct the content in this note; any grammatical or spelling errors are non-intentional. Please contact the author of this note directly if you are in need of any clarification.

## 2024-06-29 LAB — BACTERIA ABSC ANAEROBE+AEROBE CULT: ABNORMAL

## 2024-12-27 RX ORDER — PANTOPRAZOLE SODIUM 40 MG/1
40 TABLET, DELAYED RELEASE ORAL DAILY
COMMUNITY

## 2024-12-30 ENCOUNTER — ANESTHESIA (OUTPATIENT)
Dept: SURGERY | Facility: HOSPITAL | Age: 59
End: 2024-12-30
Payer: COMMERCIAL

## 2024-12-30 ENCOUNTER — ANESTHESIA EVENT (OUTPATIENT)
Dept: SURGERY | Facility: HOSPITAL | Age: 59
End: 2024-12-30
Payer: COMMERCIAL

## 2024-12-30 ENCOUNTER — HOSPITAL ENCOUNTER (INPATIENT)
Facility: HOSPITAL | Age: 59
LOS: 3 days | Discharge: HOME | End: 2025-01-02
Attending: COLON & RECTAL SURGERY | Admitting: COLON & RECTAL SURGERY
Payer: COMMERCIAL

## 2024-12-30 DIAGNOSIS — K57.92 DIVERTICULITIS: Primary | ICD-10-CM

## 2024-12-30 LAB
ABO + RH BLD: NORMAL
BLD GP AB SCN SERPL QL: NEGATIVE
CREAT SERPL-MCNC: 1 MG/DL (ref 0.67–1.17)
EGFRCR SERPLBLD CKD-EPI 2021: 88 ML/MIN/1.73M2
GLUCOSE BLDC GLUCOMTR-MCNC: 110 MG/DL (ref 70–99)
HGB BLD-MCNC: 15.7 G/DL (ref 13.3–17.7)
HOLD SPECIMEN: NORMAL
MAGNESIUM SERPL-MCNC: 1.8 MG/DL (ref 1.7–2.3)
POTASSIUM SERPL-SCNC: 4 MMOL/L (ref 3.4–5.3)
SPECIMEN EXP DATE BLD: NORMAL

## 2024-12-30 PROCEDURE — 36415 COLL VENOUS BLD VENIPUNCTURE: CPT | Performed by: COLON & RECTAL SURGERY

## 2024-12-30 PROCEDURE — 710N000009 HC RECOVERY PHASE 1, LEVEL 1, PER MIN: Performed by: COLON & RECTAL SURGERY

## 2024-12-30 PROCEDURE — 272N000001 HC OR GENERAL SUPPLY STERILE: Performed by: COLON & RECTAL SURGERY

## 2024-12-30 PROCEDURE — 258N000003 HC RX IP 258 OP 636: Performed by: COLON & RECTAL SURGERY

## 2024-12-30 PROCEDURE — 250N000011 HC RX IP 250 OP 636: Performed by: COLON & RECTAL SURGERY

## 2024-12-30 PROCEDURE — 120N000001 HC R&B MED SURG/OB

## 2024-12-30 PROCEDURE — 999N000141 HC STATISTIC PRE-PROCEDURE NURSING ASSESSMENT: Performed by: COLON & RECTAL SURGERY

## 2024-12-30 PROCEDURE — 370N000017 HC ANESTHESIA TECHNICAL FEE, PER MIN: Performed by: COLON & RECTAL SURGERY

## 2024-12-30 PROCEDURE — 82565 ASSAY OF CREATININE: CPT | Performed by: COLON & RECTAL SURGERY

## 2024-12-30 PROCEDURE — 258N000003 HC RX IP 258 OP 636: Performed by: PAIN MEDICINE

## 2024-12-30 PROCEDURE — 250N000025 HC SEVOFLURANE, PER MIN: Performed by: COLON & RECTAL SURGERY

## 2024-12-30 PROCEDURE — 258N000003 HC RX IP 258 OP 636: Performed by: NURSE ANESTHETIST, CERTIFIED REGISTERED

## 2024-12-30 PROCEDURE — 250N000009 HC RX 250: Performed by: COLON & RECTAL SURGERY

## 2024-12-30 PROCEDURE — 86900 BLOOD TYPING SEROLOGIC ABO: CPT

## 2024-12-30 PROCEDURE — 4A1BXSH MONITORING OF GASTROINTESTINAL VASCULAR PERFUSION USING INDOCYANINE GREEN DYE, EXTERNAL APPROACH: ICD-10-PCS | Performed by: COLON & RECTAL SURGERY

## 2024-12-30 PROCEDURE — 360N000080 HC SURGERY LEVEL 7, PER MIN: Performed by: COLON & RECTAL SURGERY

## 2024-12-30 PROCEDURE — 0DTN4ZZ RESECTION OF SIGMOID COLON, PERCUTANEOUS ENDOSCOPIC APPROACH: ICD-10-PCS | Performed by: COLON & RECTAL SURGERY

## 2024-12-30 PROCEDURE — 250N000011 HC RX IP 250 OP 636: Mod: JZ

## 2024-12-30 PROCEDURE — 250N000011 HC RX IP 250 OP 636

## 2024-12-30 PROCEDURE — 250N000011 HC RX IP 250 OP 636: Performed by: STUDENT IN AN ORGANIZED HEALTH CARE EDUCATION/TRAINING PROGRAM

## 2024-12-30 PROCEDURE — 86850 RBC ANTIBODY SCREEN: CPT

## 2024-12-30 PROCEDURE — 250N000011 HC RX IP 250 OP 636: Performed by: NURSE ANESTHETIST, CERTIFIED REGISTERED

## 2024-12-30 PROCEDURE — 0DJD8ZZ INSPECTION OF LOWER INTESTINAL TRACT, VIA NATURAL OR ARTIFICIAL OPENING ENDOSCOPIC: ICD-10-PCS | Performed by: COLON & RECTAL SURGERY

## 2024-12-30 PROCEDURE — 36415 COLL VENOUS BLD VENIPUNCTURE: CPT

## 2024-12-30 PROCEDURE — 83735 ASSAY OF MAGNESIUM: CPT | Performed by: COLON & RECTAL SURGERY

## 2024-12-30 PROCEDURE — 250N000011 HC RX IP 250 OP 636: Performed by: PAIN MEDICINE

## 2024-12-30 PROCEDURE — 250N000009 HC RX 250: Performed by: NURSE ANESTHETIST, CERTIFIED REGISTERED

## 2024-12-30 PROCEDURE — 88307 TISSUE EXAM BY PATHOLOGIST: CPT | Mod: TC | Performed by: COLON & RECTAL SURGERY

## 2024-12-30 PROCEDURE — 250N000013 HC RX MED GY IP 250 OP 250 PS 637: Performed by: COLON & RECTAL SURGERY

## 2024-12-30 PROCEDURE — 84132 ASSAY OF SERUM POTASSIUM: CPT | Performed by: COLON & RECTAL SURGERY

## 2024-12-30 PROCEDURE — 250N000013 HC RX MED GY IP 250 OP 250 PS 637

## 2024-12-30 PROCEDURE — 85018 HEMOGLOBIN: CPT

## 2024-12-30 PROCEDURE — 250N000011 HC RX IP 250 OP 636: Mod: JZ | Performed by: PAIN MEDICINE

## 2024-12-30 PROCEDURE — 8E0W4CZ ROBOTIC ASSISTED PROCEDURE OF TRUNK REGION, PERCUTANEOUS ENDOSCOPIC APPROACH: ICD-10-PCS | Performed by: COLON & RECTAL SURGERY

## 2024-12-30 RX ORDER — ONDANSETRON 2 MG/ML
4 INJECTION INTRAMUSCULAR; INTRAVENOUS EVERY 30 MIN PRN
Status: DISCONTINUED | OUTPATIENT
Start: 2024-12-30 | End: 2024-12-30 | Stop reason: HOSPADM

## 2024-12-30 RX ORDER — NALOXONE HYDROCHLORIDE 0.4 MG/ML
0.2 INJECTION, SOLUTION INTRAMUSCULAR; INTRAVENOUS; SUBCUTANEOUS
Status: DISCONTINUED | OUTPATIENT
Start: 2024-12-30 | End: 2025-01-02 | Stop reason: HOSPADM

## 2024-12-30 RX ORDER — ENOXAPARIN SODIUM 100 MG/ML
40 INJECTION SUBCUTANEOUS EVERY 24 HOURS
Status: DISCONTINUED | OUTPATIENT
Start: 2024-12-31 | End: 2025-01-02 | Stop reason: HOSPADM

## 2024-12-30 RX ORDER — ACETAMINOPHEN 325 MG/1
975 TABLET ORAL EVERY 8 HOURS
Status: DISCONTINUED | OUTPATIENT
Start: 2024-12-30 | End: 2025-01-02 | Stop reason: HOSPADM

## 2024-12-30 RX ORDER — SODIUM CHLORIDE, SODIUM LACTATE, POTASSIUM CHLORIDE, CALCIUM CHLORIDE 600; 310; 30; 20 MG/100ML; MG/100ML; MG/100ML; MG/100ML
INJECTION, SOLUTION INTRAVENOUS CONTINUOUS PRN
Status: DISCONTINUED | OUTPATIENT
Start: 2024-12-30 | End: 2024-12-30

## 2024-12-30 RX ORDER — DEXAMETHASONE SODIUM PHOSPHATE 4 MG/ML
4 INJECTION, SOLUTION INTRA-ARTICULAR; INTRALESIONAL; INTRAMUSCULAR; INTRAVENOUS; SOFT TISSUE
Status: DISCONTINUED | OUTPATIENT
Start: 2024-12-30 | End: 2024-12-30 | Stop reason: HOSPADM

## 2024-12-30 RX ORDER — SODIUM CHLORIDE, SODIUM LACTATE, POTASSIUM CHLORIDE, AND CALCIUM CHLORIDE .6; .31; .03; .02 G/100ML; G/100ML; G/100ML; G/100ML
IRRIGANT IRRIGATION PRN
Status: DISCONTINUED | OUTPATIENT
Start: 2024-12-30 | End: 2024-12-30 | Stop reason: HOSPADM

## 2024-12-30 RX ORDER — SODIUM CHLORIDE, SODIUM LACTATE, POTASSIUM CHLORIDE, CALCIUM CHLORIDE 600; 310; 30; 20 MG/100ML; MG/100ML; MG/100ML; MG/100ML
INJECTION, SOLUTION INTRAVENOUS CONTINUOUS
Status: DISCONTINUED | OUTPATIENT
Start: 2024-12-30 | End: 2025-01-01

## 2024-12-30 RX ORDER — INDOCYANINE GREEN AND WATER 25 MG
KIT INJECTION PRN
Status: DISCONTINUED | OUTPATIENT
Start: 2024-12-30 | End: 2024-12-30

## 2024-12-30 RX ORDER — LIDOCAINE 40 MG/G
CREAM TOPICAL
Status: DISCONTINUED | OUTPATIENT
Start: 2024-12-30 | End: 2024-12-30 | Stop reason: HOSPADM

## 2024-12-30 RX ORDER — GABAPENTIN 100 MG/1
100 CAPSULE ORAL 3 TIMES DAILY
Status: DISCONTINUED | OUTPATIENT
Start: 2024-12-30 | End: 2025-01-02 | Stop reason: HOSPADM

## 2024-12-30 RX ORDER — CEFAZOLIN SODIUM/WATER 2 G/20 ML
2 SYRINGE (ML) INTRAVENOUS
Status: COMPLETED | OUTPATIENT
Start: 2024-12-30 | End: 2024-12-30

## 2024-12-30 RX ORDER — DEXAMETHASONE SODIUM PHOSPHATE 10 MG/ML
INJECTION, SOLUTION INTRAMUSCULAR; INTRAVENOUS PRN
Status: DISCONTINUED | OUTPATIENT
Start: 2024-12-30 | End: 2024-12-30

## 2024-12-30 RX ORDER — HYDROMORPHONE HCL IN WATER/PF 6 MG/30 ML
0.4 PATIENT CONTROLLED ANALGESIA SYRINGE INTRAVENOUS
Status: DISCONTINUED | OUTPATIENT
Start: 2024-12-30 | End: 2024-12-30

## 2024-12-30 RX ORDER — HEPARIN SODIUM 5000 [USP'U]/.5ML
5000 INJECTION, SOLUTION INTRAVENOUS; SUBCUTANEOUS
Status: COMPLETED | OUTPATIENT
Start: 2024-12-30 | End: 2024-12-30

## 2024-12-30 RX ORDER — LABETALOL HYDROCHLORIDE 5 MG/ML
10 INJECTION, SOLUTION INTRAVENOUS ONCE
Status: COMPLETED | OUTPATIENT
Start: 2024-12-30 | End: 2024-12-30

## 2024-12-30 RX ORDER — ACETAMINOPHEN 325 MG/1
975 TABLET ORAL ONCE
Status: COMPLETED | OUTPATIENT
Start: 2024-12-30 | End: 2024-12-30

## 2024-12-30 RX ORDER — FENTANYL CITRATE 50 UG/ML
50 INJECTION, SOLUTION INTRAMUSCULAR; INTRAVENOUS EVERY 5 MIN PRN
Status: DISCONTINUED | OUTPATIENT
Start: 2024-12-30 | End: 2024-12-30 | Stop reason: HOSPADM

## 2024-12-30 RX ORDER — CEFAZOLIN SODIUM/WATER 2 G/20 ML
2 SYRINGE (ML) INTRAVENOUS SEE ADMIN INSTRUCTIONS
Status: DISCONTINUED | OUTPATIENT
Start: 2024-12-30 | End: 2024-12-30 | Stop reason: HOSPADM

## 2024-12-30 RX ORDER — FENTANYL CITRATE 50 UG/ML
INJECTION, SOLUTION INTRAMUSCULAR; INTRAVENOUS PRN
Status: DISCONTINUED | OUTPATIENT
Start: 2024-12-30 | End: 2024-12-30

## 2024-12-30 RX ORDER — HYDROMORPHONE HCL IN WATER/PF 6 MG/30 ML
0.4 PATIENT CONTROLLED ANALGESIA SYRINGE INTRAVENOUS EVERY 5 MIN PRN
Status: DISCONTINUED | OUTPATIENT
Start: 2024-12-30 | End: 2024-12-30 | Stop reason: HOSPADM

## 2024-12-30 RX ORDER — HYDRALAZINE HYDROCHLORIDE 20 MG/ML
2.5-5 INJECTION INTRAMUSCULAR; INTRAVENOUS EVERY 10 MIN PRN
Status: DISCONTINUED | OUTPATIENT
Start: 2024-12-30 | End: 2024-12-30 | Stop reason: HOSPADM

## 2024-12-30 RX ORDER — SIMETHICONE 80 MG
80 TABLET,CHEWABLE ORAL 4 TIMES DAILY PRN
Status: DISCONTINUED | OUTPATIENT
Start: 2024-12-30 | End: 2025-01-02 | Stop reason: HOSPADM

## 2024-12-30 RX ORDER — PANTOPRAZOLE SODIUM 40 MG/1
40 TABLET, DELAYED RELEASE ORAL
Status: DISCONTINUED | OUTPATIENT
Start: 2024-12-31 | End: 2025-01-02 | Stop reason: HOSPADM

## 2024-12-30 RX ORDER — GLYCOPYRROLATE 0.2 MG/ML
INJECTION, SOLUTION INTRAMUSCULAR; INTRAVENOUS PRN
Status: DISCONTINUED | OUTPATIENT
Start: 2024-12-30 | End: 2024-12-30

## 2024-12-30 RX ORDER — NALOXONE HYDROCHLORIDE 0.4 MG/ML
0.1 INJECTION, SOLUTION INTRAMUSCULAR; INTRAVENOUS; SUBCUTANEOUS
Status: DISCONTINUED | OUTPATIENT
Start: 2024-12-30 | End: 2024-12-30 | Stop reason: HOSPADM

## 2024-12-30 RX ORDER — PROPOFOL 10 MG/ML
INJECTION, EMULSION INTRAVENOUS PRN
Status: DISCONTINUED | OUTPATIENT
Start: 2024-12-30 | End: 2024-12-30

## 2024-12-30 RX ORDER — LABETALOL HYDROCHLORIDE 5 MG/ML
10-20 INJECTION, SOLUTION INTRAVENOUS EVERY 10 MIN PRN
Status: DISCONTINUED | OUTPATIENT
Start: 2024-12-30 | End: 2025-01-02 | Stop reason: HOSPADM

## 2024-12-30 RX ORDER — LIDOCAINE HYDROCHLORIDE 10 MG/ML
INJECTION, SOLUTION INFILTRATION; PERINEURAL PRN
Status: DISCONTINUED | OUTPATIENT
Start: 2024-12-30 | End: 2024-12-30

## 2024-12-30 RX ORDER — HYDROMORPHONE HCL IN WATER/PF 6 MG/30 ML
0.2 PATIENT CONTROLLED ANALGESIA SYRINGE INTRAVENOUS
Status: DISCONTINUED | OUTPATIENT
Start: 2024-12-30 | End: 2024-12-30

## 2024-12-30 RX ORDER — SODIUM CHLORIDE, SODIUM LACTATE, POTASSIUM CHLORIDE, CALCIUM CHLORIDE 600; 310; 30; 20 MG/100ML; MG/100ML; MG/100ML; MG/100ML
INJECTION, SOLUTION INTRAVENOUS CONTINUOUS
Status: DISCONTINUED | OUTPATIENT
Start: 2024-12-30 | End: 2024-12-30 | Stop reason: HOSPADM

## 2024-12-30 RX ORDER — DIPHENHYDRAMINE HCL 25 MG
25 CAPSULE ORAL EVERY 6 HOURS PRN
Status: DISCONTINUED | OUTPATIENT
Start: 2024-12-30 | End: 2025-01-02 | Stop reason: HOSPADM

## 2024-12-30 RX ORDER — HYDROMORPHONE HCL IN WATER/PF 6 MG/30 ML
0.2 PATIENT CONTROLLED ANALGESIA SYRINGE INTRAVENOUS EVERY 5 MIN PRN
Status: DISCONTINUED | OUTPATIENT
Start: 2024-12-30 | End: 2024-12-30 | Stop reason: HOSPADM

## 2024-12-30 RX ORDER — METRONIDAZOLE 500 MG/100ML
500 INJECTION, SOLUTION INTRAVENOUS
Status: COMPLETED | OUTPATIENT
Start: 2024-12-30 | End: 2024-12-30

## 2024-12-30 RX ORDER — ONDANSETRON 2 MG/ML
4 INJECTION INTRAMUSCULAR; INTRAVENOUS ONCE
Status: COMPLETED | OUTPATIENT
Start: 2024-12-30 | End: 2024-12-30

## 2024-12-30 RX ORDER — LIDOCAINE 40 MG/G
CREAM TOPICAL
Status: DISCONTINUED | OUTPATIENT
Start: 2024-12-30 | End: 2025-01-02 | Stop reason: HOSPADM

## 2024-12-30 RX ORDER — MAGNESIUM HYDROXIDE 1200 MG/15ML
LIQUID ORAL PRN
Status: DISCONTINUED | OUTPATIENT
Start: 2024-12-30 | End: 2024-12-30 | Stop reason: HOSPADM

## 2024-12-30 RX ORDER — NALOXONE HYDROCHLORIDE 0.4 MG/ML
0.4 INJECTION, SOLUTION INTRAMUSCULAR; INTRAVENOUS; SUBCUTANEOUS
Status: DISCONTINUED | OUTPATIENT
Start: 2024-12-30 | End: 2025-01-02 | Stop reason: HOSPADM

## 2024-12-30 RX ORDER — ONDANSETRON 4 MG/1
4 TABLET, ORALLY DISINTEGRATING ORAL EVERY 6 HOURS PRN
Status: DISCONTINUED | OUTPATIENT
Start: 2024-12-30 | End: 2025-01-02 | Stop reason: HOSPADM

## 2024-12-30 RX ORDER — ONDANSETRON 2 MG/ML
INJECTION INTRAMUSCULAR; INTRAVENOUS PRN
Status: DISCONTINUED | OUTPATIENT
Start: 2024-12-30 | End: 2024-12-30

## 2024-12-30 RX ORDER — DIPHENHYDRAMINE HYDROCHLORIDE 50 MG/ML
25 INJECTION INTRAMUSCULAR; INTRAVENOUS EVERY 6 HOURS PRN
Status: DISCONTINUED | OUTPATIENT
Start: 2024-12-30 | End: 2025-01-02 | Stop reason: HOSPADM

## 2024-12-30 RX ORDER — BUPIVACAINE HYDROCHLORIDE 2.5 MG/ML
INJECTION, SOLUTION EPIDURAL; INFILTRATION; INTRACAUDAL
Status: COMPLETED | OUTPATIENT
Start: 2024-12-30 | End: 2024-12-30

## 2024-12-30 RX ORDER — HEPARIN SODIUM 5000 [USP'U]/.5ML
INJECTION, SOLUTION INTRAVENOUS; SUBCUTANEOUS
Status: DISCONTINUED
Start: 2024-12-30 | End: 2024-12-30 | Stop reason: HOSPADM

## 2024-12-30 RX ORDER — ONDANSETRON 2 MG/ML
4 INJECTION INTRAMUSCULAR; INTRAVENOUS EVERY 6 HOURS PRN
Status: DISCONTINUED | OUTPATIENT
Start: 2024-12-30 | End: 2025-01-02 | Stop reason: HOSPADM

## 2024-12-30 RX ORDER — ONDANSETRON 4 MG/1
4 TABLET, ORALLY DISINTEGRATING ORAL EVERY 30 MIN PRN
Status: DISCONTINUED | OUTPATIENT
Start: 2024-12-30 | End: 2024-12-30 | Stop reason: HOSPADM

## 2024-12-30 RX ORDER — FENTANYL CITRATE 50 UG/ML
25 INJECTION, SOLUTION INTRAMUSCULAR; INTRAVENOUS EVERY 5 MIN PRN
Status: DISCONTINUED | OUTPATIENT
Start: 2024-12-30 | End: 2024-12-30 | Stop reason: HOSPADM

## 2024-12-30 RX ADMIN — ROCURONIUM BROMIDE 20 MG: 50 INJECTION, SOLUTION INTRAVENOUS at 09:46

## 2024-12-30 RX ADMIN — HYDROMORPHONE HYDROCHLORIDE 0.4 MG: 0.2 INJECTION, SOLUTION INTRAMUSCULAR; INTRAVENOUS; SUBCUTANEOUS at 16:52

## 2024-12-30 RX ADMIN — GABAPENTIN 100 MG: 100 CAPSULE ORAL at 15:19

## 2024-12-30 RX ADMIN — METRONIDAZOLE 500 MG: 500 INJECTION, SOLUTION INTRAVENOUS at 06:24

## 2024-12-30 RX ADMIN — HYDROMORPHONE HYDROCHLORIDE 0.5 MG: 1 INJECTION, SOLUTION INTRAMUSCULAR; INTRAVENOUS; SUBCUTANEOUS at 08:32

## 2024-12-30 RX ADMIN — DEXMEDETOMIDINE HYDROCHLORIDE 4 MCG: 100 INJECTION, SOLUTION INTRAVENOUS at 09:02

## 2024-12-30 RX ADMIN — LABETALOL HYDROCHLORIDE 10 MG: 5 INJECTION, SOLUTION INTRAVENOUS at 13:18

## 2024-12-30 RX ADMIN — SODIUM CHLORIDE, POTASSIUM CHLORIDE, SODIUM LACTATE AND CALCIUM CHLORIDE: 600; 310; 30; 20 INJECTION, SOLUTION INTRAVENOUS at 06:22

## 2024-12-30 RX ADMIN — SODIUM CHLORIDE, POTASSIUM CHLORIDE, SODIUM LACTATE AND CALCIUM CHLORIDE: 600; 310; 30; 20 INJECTION, SOLUTION INTRAVENOUS at 09:13

## 2024-12-30 RX ADMIN — ROCURONIUM BROMIDE 30 MG: 50 INJECTION, SOLUTION INTRAVENOUS at 08:18

## 2024-12-30 RX ADMIN — ROCURONIUM BROMIDE 50 MG: 50 INJECTION, SOLUTION INTRAVENOUS at 07:54

## 2024-12-30 RX ADMIN — DEXMEDETOMIDINE HYDROCHLORIDE 16 MCG: 100 INJECTION, SOLUTION INTRAVENOUS at 08:42

## 2024-12-30 RX ADMIN — ACETAMINOPHEN 975 MG: 325 TABLET ORAL at 15:19

## 2024-12-30 RX ADMIN — PHENYLEPHRINE HYDROCHLORIDE 100 MCG: 10 INJECTION INTRAVENOUS at 08:21

## 2024-12-30 RX ADMIN — PHENYLEPHRINE HYDROCHLORIDE 50 MCG: 10 INJECTION INTRAVENOUS at 10:45

## 2024-12-30 RX ADMIN — DEXAMETHASONE SODIUM PHOSPHATE 10 MG: 10 INJECTION, SOLUTION INTRAMUSCULAR; INTRAVENOUS at 08:18

## 2024-12-30 RX ADMIN — HYDROMORPHONE HYDROCHLORIDE 0.4 MG: 0.2 INJECTION, SOLUTION INTRAMUSCULAR; INTRAVENOUS; SUBCUTANEOUS at 13:42

## 2024-12-30 RX ADMIN — HYDROMORPHONE HYDROCHLORIDE 0.4 MG: 0.2 INJECTION, SOLUTION INTRAMUSCULAR; INTRAVENOUS; SUBCUTANEOUS at 12:36

## 2024-12-30 RX ADMIN — PROPOFOL 200 MG: 10 INJECTION, EMULSION INTRAVENOUS at 07:52

## 2024-12-30 RX ADMIN — BUPIVACAINE HYDROCHLORIDE 20 ML: 2.5 INJECTION, SOLUTION EPIDURAL; INFILTRATION; INTRACAUDAL; PERINEURAL at 08:02

## 2024-12-30 RX ADMIN — HYDROMORPHONE HYDROCHLORIDE 0.4 MG: 0.2 INJECTION, SOLUTION INTRAMUSCULAR; INTRAVENOUS; SUBCUTANEOUS at 12:44

## 2024-12-30 RX ADMIN — SUGAMMADEX 200 MG: 100 INJECTION, SOLUTION INTRAVENOUS at 11:44

## 2024-12-30 RX ADMIN — PHENYLEPHRINE HYDROCHLORIDE 100 MCG: 10 INJECTION INTRAVENOUS at 08:02

## 2024-12-30 RX ADMIN — GABAPENTIN 100 MG: 100 CAPSULE ORAL at 21:52

## 2024-12-30 RX ADMIN — HYDROMORPHONE HYDROCHLORIDE 0.5 MG: 1 INJECTION, SOLUTION INTRAMUSCULAR; INTRAVENOUS; SUBCUTANEOUS at 09:44

## 2024-12-30 RX ADMIN — FENTANYL CITRATE 25 MCG: 50 INJECTION, SOLUTION INTRAMUSCULAR; INTRAVENOUS at 12:07

## 2024-12-30 RX ADMIN — ONDANSETRON 4 MG: 2 INJECTION INTRAMUSCULAR; INTRAVENOUS at 06:31

## 2024-12-30 RX ADMIN — ROCURONIUM BROMIDE 10 MG: 50 INJECTION, SOLUTION INTRAVENOUS at 10:27

## 2024-12-30 RX ADMIN — ONDANSETRON 4 MG: 4 TABLET, ORALLY DISINTEGRATING ORAL at 19:38

## 2024-12-30 RX ADMIN — HEPARIN SODIUM 5000 UNITS: 5000 INJECTION, SOLUTION INTRAVENOUS; SUBCUTANEOUS at 06:19

## 2024-12-30 RX ADMIN — HYDROMORPHONE HYDROCHLORIDE 0.5 MG: 1 INJECTION, SOLUTION INTRAMUSCULAR; INTRAVENOUS; SUBCUTANEOUS at 11:31

## 2024-12-30 RX ADMIN — SODIUM CHLORIDE, POTASSIUM CHLORIDE, SODIUM LACTATE AND CALCIUM CHLORIDE: 600; 310; 30; 20 INJECTION, SOLUTION INTRAVENOUS at 07:48

## 2024-12-30 RX ADMIN — BUPIVACAINE HYDROCHLORIDE 20 ML: 2.5 INJECTION, SOLUTION EPIDURAL; INFILTRATION; INTRACAUDAL; PERINEURAL at 08:04

## 2024-12-30 RX ADMIN — HYDROMORPHONE HYDROCHLORIDE 0.2 MG: 0.2 INJECTION, SOLUTION INTRAMUSCULAR; INTRAVENOUS; SUBCUTANEOUS at 12:27

## 2024-12-30 RX ADMIN — FENTANYL CITRATE 25 MCG: 50 INJECTION, SOLUTION INTRAMUSCULAR; INTRAVENOUS at 12:14

## 2024-12-30 RX ADMIN — ONDANSETRON 4 MG: 2 INJECTION INTRAMUSCULAR; INTRAVENOUS at 15:18

## 2024-12-30 RX ADMIN — FENTANYL CITRATE 50 MCG: 50 INJECTION, SOLUTION INTRAMUSCULAR; INTRAVENOUS at 12:20

## 2024-12-30 RX ADMIN — HYDROMORPHONE HYDROCHLORIDE 0.4 MG: 0.2 INJECTION, SOLUTION INTRAMUSCULAR; INTRAVENOUS; SUBCUTANEOUS at 12:51

## 2024-12-30 RX ADMIN — LIDOCAINE HYDROCHLORIDE 5 ML: 10 INJECTION, SOLUTION INFILTRATION; PERINEURAL at 07:52

## 2024-12-30 RX ADMIN — HYDRALAZINE HYDROCHLORIDE 5 MG: 20 INJECTION INTRAMUSCULAR; INTRAVENOUS at 14:00

## 2024-12-30 RX ADMIN — Medication: at 18:52

## 2024-12-30 RX ADMIN — FENTANYL CITRATE 100 MCG: 50 INJECTION, SOLUTION INTRAMUSCULAR; INTRAVENOUS at 07:52

## 2024-12-30 RX ADMIN — HYDROMORPHONE HYDROCHLORIDE 0.5 MG: 1 INJECTION, SOLUTION INTRAMUSCULAR; INTRAVENOUS; SUBCUTANEOUS at 09:01

## 2024-12-30 RX ADMIN — MIDAZOLAM HYDROCHLORIDE 2 MG: 1 INJECTION, SOLUTION INTRAMUSCULAR; INTRAVENOUS at 07:48

## 2024-12-30 RX ADMIN — HYDRALAZINE HYDROCHLORIDE 5 MG: 20 INJECTION INTRAMUSCULAR; INTRAVENOUS at 14:17

## 2024-12-30 RX ADMIN — ONDANSETRON 4 MG: 2 INJECTION INTRAMUSCULAR; INTRAVENOUS at 08:16

## 2024-12-30 RX ADMIN — PHENYLEPHRINE HYDROCHLORIDE 100 MCG: 10 INJECTION INTRAVENOUS at 08:28

## 2024-12-30 RX ADMIN — HYDROMORPHONE HYDROCHLORIDE 0.2 MG: 0.2 INJECTION, SOLUTION INTRAMUSCULAR; INTRAVENOUS; SUBCUTANEOUS at 14:16

## 2024-12-30 RX ADMIN — ACETAMINOPHEN 975 MG: 325 TABLET ORAL at 06:17

## 2024-12-30 RX ADMIN — SODIUM CHLORIDE, POTASSIUM CHLORIDE, SODIUM LACTATE AND CALCIUM CHLORIDE: 600; 310; 30; 20 INJECTION, SOLUTION INTRAVENOUS at 15:10

## 2024-12-30 RX ADMIN — INDOCYANINE GREEN AND WATER 7.5 MG: KIT at 10:12

## 2024-12-30 RX ADMIN — Medication 2 G: at 08:09

## 2024-12-30 ASSESSMENT — ACTIVITIES OF DAILY LIVING (ADL)
ADLS_ACUITY_SCORE: 41
ADLS_ACUITY_SCORE: 42
ADLS_ACUITY_SCORE: 41
ADLS_ACUITY_SCORE: 41
ADLS_ACUITY_SCORE: 39
ADLS_ACUITY_SCORE: 41
ADLS_ACUITY_SCORE: 41
ADLS_ACUITY_SCORE: 39
ADLS_ACUITY_SCORE: 41
ADLS_ACUITY_SCORE: 39
ADLS_ACUITY_SCORE: 42
ADLS_ACUITY_SCORE: 42
ADLS_ACUITY_SCORE: 41
ADLS_ACUITY_SCORE: 41
ADLS_ACUITY_SCORE: 39
ADLS_ACUITY_SCORE: 41

## 2024-12-30 NOTE — PROGRESS NOTES
Called and updated . /76, heart rate=88,respiratory rate =21 [id number] =96 at 3L/nc. Patient ok to transfer to floor.

## 2024-12-30 NOTE — ANESTHESIA POSTPROCEDURE EVALUATION
Patient: Chase Mcdaniels    Procedure: Procedure(s):  ROBOTIC SIGMOID COLECTOMY       Anesthesia Type:  General    Note:  Disposition: Admission   Postop Pain Control: Uneventful            Sign Out: Well controlled pain   PONV: No   Neuro/Psych: Uneventful            Sign Out: Acceptable/Baseline neuro status   Airway/Respiratory: Uneventful            Sign Out: Acceptable/Baseline resp. status   CV/Hemodynamics: Uneventful            Sign Out: Acceptable CV status; No obvious hypovolemia; No obvious fluid overload   Other NRE: NONE   DID A NON-ROUTINE EVENT OCCUR? No           Last vitals:  Vitals Value Taken Time   /97 12/30/24 1200   Temp 32.4  C (90.32  F) 12/30/24 1208   Pulse 81 12/30/24 1209   Resp 12 12/30/24 1209   SpO2 97 % 12/30/24 1209   Vitals shown include unfiled device data.    Electronically Signed By: Jn Pompa MD  December 30, 2024  12:11 PM

## 2024-12-30 NOTE — PHARMACY-ADMISSION MEDICATION HISTORY
Pharmacist Admission Medication History    Admission medication history is complete. The information provided in this note is only as accurate as the sources available at the time of the update.    Information Source(s): Patient, Clinic records, and CareEverywhere/SureScripts via in-person    Allergies reviewed with patient and updates made in EHR: yes    Medication History Completed By: Serena Zambrano RPH 12/30/2024 6:23 AM    PTA Med List   Medication Sig Note Last Dose/Taking    pantoprazole (PROTONIX) 40 MG EC tablet Take 40 mg by mouth daily. BEFORE A MEAL 12/30/2024: Saturday 28th 12/28/2024

## 2024-12-30 NOTE — BRIEF OP NOTE
[facility]    Brief Operative Note    Pre-operative diagnosis: Diverticulitis of large intestine without perforation or abscess without bleeding [K57.32]  Post-operative diagnosis Same as pre-operative diagnosis    Procedure: ROBOTIC SIGMOID COLECTOMY, N/A - Abdomen    Surgeon: Surgeons and Role:     * Alina Lange MD - Primary     * Anand Giordano PA-C - Assisting  Anesthesia: General with Block   Estimated Blood Loss: Less than 50 ml    Drains: None  Specimens:   ID Type Source Tests Collected by Time Destination   1 : SIGMOID COLON; OPEN & RETURN Tissue Large Intestine, Colon, Sigmoid SURGICAL PATHOLOGY EXAM Alina Lange MD 12/30/2024 10:33 AM    2 : ANASTOMOSIS RINGS Tissue Other SURGICAL PATHOLOGY EXAM Alina Lange MD 12/30/2024 11:08 AM      Findings:   Adhesions to abdominal wall near bladder .  Complications: None.  Implants: * No implants in log *    POSTOPERATIVE / POSTPROCEDURE NOTE - IMMEDIATE :    Surgeon(s)/Proceduralist(s) and Assistants (if any):  Surgeon(s):  Alina Lange MD Milek, Dulcey Amaia, PA-C  Circulator: Caterina Scanlon RN  Relief Circulator: Marah Orlando RN  Relief Scrub: Kelly Castro  Scrub Person: Kelly Patterson PA-C      ADDENDUM:    PATIENT DATA  Indicate Y or N:  Home O2 No  Hemodialysis No  Transplant patient No  Cirrhosis No  Steroids in last 30 days No  Immunomodulators in last 30 days No  Anticoagulation at time of surgery No  Prior abdominal surgery Yes  Pelvic irradiation No    Albumin within 30 days if known   Hgb within 30 days if known 15.7  Cr within 30 days if known 1.05  Body mass index is 29.86 kg/m .    OR DATA  Emergent No   <24 hours No   <1 week No  Bowel Prep (Y or N) Yes  Antibiotics (Y or N) Yes  DVT prophylaxis    Heparin Yes   SCD Yes   None No  Drain No  ASA (1,2,3,4,5 if unknown) 2  OR time (min) 209 min  Stents No  Transfuse >/= 2U No  Anastomosis   Stapled Yes   Handsewn  No  Leak Test (pos, neg, not done) Negative

## 2024-12-30 NOTE — ANESTHESIA PROCEDURE NOTES
Airway       Patient location during procedure: OR       Procedure Start/Stop Times: 12/30/2024 7:55 AM  Staff -        CRNA: Florecita, Veronica, SERAFIN CRNA       Performed By: CRNAIndications and Patient Condition       Induction type:intravenous       Mask difficulty assessment: 1 - vent by mask    Final Airway Details       Final airway type: endotracheal airway       Successful airway: ETT - single  Endotracheal Airway Details        ETT size (mm): 7.5       Cuffed: yes       Cuff volume (mL): 8       Successful intubation technique: direct laryngoscopy       DL Blade Type: MAC 4       Grade View of Cords: 1       Position: Right       Measured from: gums/teeth       Secured at (cm): 23       Bite block used: None    Post intubation assessment        Placement verified by: capnometry, equal breath sounds and chest rise        Number of attempts at approach: 1       Number of other approaches attempted: 0       Secured with: tape       Ease of procedure: easy       Dentition: Intact and Unchanged    Medication(s) Administered   Medication Administration Time: 12/30/2024 7:55 AM

## 2024-12-30 NOTE — ANESTHESIA PROCEDURE NOTES
"TAP Procedure Note    Pre-Procedure   Staff -        Anesthesiologist:  Jn Pompa MD       Performed By: anesthesiologist       Location: OR       Procedure Start/Stop Times: 12/30/2024 8:00 AM and 12/30/2024 8:05 AM       Pre-Anesthestic Checklist: patient identified, IV checked, site marked, risks and benefits discussed, informed consent, monitors and equipment checked, pre-op evaluation, at physician/surgeon's request and post-op pain management  Timeout:       Correct Patient: Yes        Correct Procedure: Yes        Correct Site: Yes        Correct Position: Yes        Correct Laterality: Yes        Site Marked: Yes  Procedure Documentation  Procedure: TAP       Laterality: bilateral       Patient Position: supine       Skin prep: Chloraprep       Needle Type: insulated       Needle Gauge: 20.        Needle Length (Inches): 4        1. Ultrasound was used to identify targeted nerve, plexus, vascular marker, or fascial plane and place a needle adjacent to it in real-time.       2. Ultrasound was used to visualize the spread of anesthetic in close proximity to the above referenced structure.       3. A permanent image is entered into the patient's record.    Assessment/Narrative         Bolus given via. no blood aspirated via catheter.        Secured via.        Insertion/Infusion Method: Single Shot       Complications: none    Medication(s) Administered   Bupivacaine 0.25% PF (Infiltration) - Infiltration   20 mL - 12/30/2024 8:02:00 AM   20 mL - 12/30/2024 8:04:00 AM  Medication Administration Time: 12/30/2024 8:00 AM      FOR [facility] ([facility] [place]) ONLY:   Pain Team Contact information: please page the Pain Team [employer] Search \"Pain\". During daytime hours, please page the attending first. At night please page the resident first.      "

## 2024-12-30 NOTE — PROGRESS NOTES
The History and Physical has been reviewed, the patient has been examined and no changes have occurred in the patient's condition since the H & P was completed.     Alina Lange MD

## 2024-12-30 NOTE — ANESTHESIA CARE TRANSFER NOTE
Patient: Chase Mcdaniels    Procedure: Procedure(s):  ROBOTIC SIGMOID COLECTOMY       Diagnosis: Diverticulitis of large intestine without perforation or abscess without bleeding [K57.32]  Diagnosis Additional Information: No value filed.    Anesthesia Type:   General     Note:    Oropharynx: oropharynx clear of all foreign objects and spontaneously breathing  Level of Consciousness: awake  Oxygen Supplementation: face mask  Level of Supplemental Oxygen (L/min / FiO2): 6  Independent Airway: airway patency satisfactory and stable  Dentition: dentition unchanged  Vital Signs Stable: post-procedure vital signs reviewed and stable  Report to RN Given: handoff report given  Patient transferred to: PACU    Handoff Report: Identifed the Patient, Identified the Reponsible Provider, Reviewed the pertinent medical history, Discussed the surgical course, Reviewed Intra-OP anesthesia mangement and issues during anesthesia, Set expectations for post-procedure period and Allowed opportunity for questions and acknowledgement of understanding      Vitals:  Vitals Value Taken Time   /106 12/30/24 1151   Temp 37.1  C (98.78  F) 12/30/24 1153   Pulse 97 12/30/24 1153   Resp 11 12/30/24 1153   SpO2 95 % 12/30/24 1153   Vitals shown include unfiled device data.    Electronically Signed By: SERAFIN Colon CRNA  December 30, 2024  11:55 AM

## 2024-12-30 NOTE — OR NURSING
Informed Dr. Lange of pt.'s poor pain control, no new orders.  Called Dr. Wall from anesthesia, told him about pt.'s high blood pressure, 10 mg labetolol IV given per ordered.  Dr. Wall ordered to give max dose of dilaudid if needed per PACU orders, and if max dose is reached to call him back.

## 2024-12-30 NOTE — ANESTHESIA PREPROCEDURE EVALUATION
Anesthesia Pre-Procedure Evaluation    Patient: Chase Mcdaniels   MRN: [id number] : 1965        Procedure : Procedure(s):  ROBOTIC SIGMOID COLECTOMY          Past Medical History:   Diagnosis Date    Adenomatous colon polyp     Chronic diarrhea     Gout     Hypertriglyceridemia       Past Surgical History:   Procedure Laterality Date    ARTHROSCOPY KNEE      X2    FINGER TENDON REPAIR      HERNIA REPAIR, INGUINAL RT/LT      LAP VENTRAL HERNIA REPAIR      NASAL RECONSTRUCTION      RIGHT TIB/FIB REPLACEMENT      MAGED REMOVED IN     TOE SURGERY        No Known Allergies   Social History     Tobacco Use    Smoking status: Never    Smokeless tobacco: Not on file   Substance Use Topics    Alcohol use: Not Currently      Wt Readings from Last 1 Encounters:   24 94.4 kg (208 lb 2 oz)        Anesthesia Evaluation   Pt has had prior anesthetic. Type: General.    No history of anesthetic complications       ROS/MED HX  ENT/Pulmonary:       Neurologic:       Cardiovascular:       METS/Exercise Tolerance:     Hematologic:       Musculoskeletal:       GI/Hepatic:       Renal/Genitourinary:       Endo:       Psychiatric/Substance Use:       Infectious Disease:       Malignancy:       Other:            Physical Exam    Airway        Mallampati: III   TM distance: > 3 FB   Neck ROM: full   Mouth opening: > 3 cm    Respiratory Devices and Support         Dental       (+) Minor Abnormalities - some fillings, tiny chips      Cardiovascular   cardiovascular exam normal          Pulmonary   pulmonary exam normal                OUTSIDE LABS:  CBC:   Lab Results   Component Value Date    WBC 10.2 2024    WBC 19.9 (H) 2024    HGB 12.8 (L) 2024    HGB 16.4 2024    HCT 37.7 (L) 2024    HCT 45.7 2024     2024     (L) 2024     BMP:   Lab Results   Component Value Date     2024    POTASSIUM 3.7 2024    CHLORIDE 100 2024    CO2 23 2024  "   BUN 19.9 05/24/2024    CR 1.05 05/27/2024    CR 1.12 05/24/2024     (H) 12/30/2024     (H) 05/24/2024     COAGS: No results found for: \"PTT\", \"INR\", \"FIBR\"  POC: No results found for: \"BGM\", \"HCG\", \"HCGS\"  HEPATIC:   Lab Results   Component Value Date    ALBUMIN 4.6 05/24/2024    PROTTOTAL 7.6 05/24/2024    ALT 37 05/24/2024    AST 30 05/24/2024    ALKPHOS 99 05/24/2024    BILITOTAL 1.3 (H) 05/24/2024     OTHER:   Lab Results   Component Value Date    LACT 0.9 05/24/2024    MARIA GUADALUPE 9.8 05/24/2024       Anesthesia Plan    ASA Status:  2    NPO Status:  NPO Appropriate    Anesthesia Type: General.     - Airway: ETT      Maintenance: Inhalation.        Consents    Anesthesia Plan(s) and associated risks, benefits, and realistic alternatives discussed. Questions answered and patient/representative(s) expressed understanding.     - Discussed: Risks, Benefits and Alternatives for BOTH SEDATION and the PROCEDURE were discussed     - Discussed with:  Patient            Postoperative Care    Pain management: IV analgesics, Oral pain medications.   PONV prophylaxis: Ondansetron (or other 5HT-3)     Comments:               Jn Pompa MD    I have reviewed the pertinent notes and labs in the chart from the past 30 days and (re)examined the patient.  Any updates or changes from those notes are reflected in this note.                         # Overweight: Estimated body mass index is 29.86 kg/m  as calculated from the following:    Height as of this encounter: 1.778 m (5' 10\").    Weight as of this encounter: 94.4 kg (208 lb 2 oz).             "

## 2024-12-31 LAB
ANION GAP SERPL CALCULATED.3IONS-SCNC: 10 MMOL/L (ref 7–15)
BUN SERPL-MCNC: 11.2 MG/DL (ref 6–20)
CALCIUM SERPL-MCNC: 9.6 MG/DL (ref 8.8–10.4)
CHLORIDE SERPL-SCNC: 102 MMOL/L (ref 98–107)
CREAT SERPL-MCNC: 0.91 MG/DL (ref 0.67–1.17)
EGFRCR SERPLBLD CKD-EPI 2021: >90 ML/MIN/1.73M2
ERYTHROCYTE [DISTWIDTH] IN BLOOD BY AUTOMATED COUNT: 13.2 % (ref 10–15)
GLUCOSE SERPL-MCNC: 115 MG/DL (ref 70–99)
HCO3 SERPL-SCNC: 26 MMOL/L (ref 22–29)
HCT VFR BLD AUTO: 41.2 % (ref 40–53)
HGB BLD-MCNC: 14.7 G/DL (ref 13.3–17.7)
MAGNESIUM SERPL-MCNC: 1.9 MG/DL (ref 1.7–2.3)
MCH RBC QN AUTO: 30.3 PG (ref 26.5–33)
MCHC RBC AUTO-ENTMCNC: 35.7 G/DL (ref 31.5–36.5)
MCV RBC AUTO: 85 FL (ref 78–100)
PATH REPORT.COMMENTS IMP SPEC: NORMAL
PATH REPORT.COMMENTS IMP SPEC: NORMAL
PATH REPORT.FINAL DX SPEC: NORMAL
PATH REPORT.GROSS SPEC: NORMAL
PATH REPORT.MICROSCOPIC SPEC OTHER STN: NORMAL
PATH REPORT.RELEVANT HX SPEC: NORMAL
PHOTO IMAGE: NORMAL
PLATELET # BLD AUTO: 181 10E3/UL (ref 150–450)
POTASSIUM SERPL-SCNC: 4.2 MMOL/L (ref 3.4–5.3)
RBC # BLD AUTO: 4.85 10E6/UL (ref 4.4–5.9)
SODIUM SERPL-SCNC: 138 MMOL/L (ref 135–145)
WBC # BLD AUTO: 10.7 10E3/UL (ref 4–11)

## 2024-12-31 PROCEDURE — 80048 BASIC METABOLIC PNL TOTAL CA: CPT | Performed by: COLON & RECTAL SURGERY

## 2024-12-31 PROCEDURE — 83735 ASSAY OF MAGNESIUM: CPT | Performed by: COLON & RECTAL SURGERY

## 2024-12-31 PROCEDURE — 88307 TISSUE EXAM BY PATHOLOGIST: CPT | Mod: 26 | Performed by: PATHOLOGY

## 2024-12-31 PROCEDURE — 85014 HEMATOCRIT: CPT | Performed by: COLON & RECTAL SURGERY

## 2024-12-31 PROCEDURE — 250N000009 HC RX 250: Performed by: COLON & RECTAL SURGERY

## 2024-12-31 PROCEDURE — 120N000001 HC R&B MED SURG/OB

## 2024-12-31 PROCEDURE — 250N000013 HC RX MED GY IP 250 OP 250 PS 637: Performed by: COLON & RECTAL SURGERY

## 2024-12-31 PROCEDURE — 258N000003 HC RX IP 258 OP 636: Performed by: COLON & RECTAL SURGERY

## 2024-12-31 PROCEDURE — 82310 ASSAY OF CALCIUM: CPT | Performed by: COLON & RECTAL SURGERY

## 2024-12-31 PROCEDURE — 36415 COLL VENOUS BLD VENIPUNCTURE: CPT | Performed by: COLON & RECTAL SURGERY

## 2024-12-31 PROCEDURE — 250N000011 HC RX IP 250 OP 636: Performed by: COLON & RECTAL SURGERY

## 2024-12-31 RX ORDER — CYCLOBENZAPRINE HCL 10 MG
10 TABLET ORAL EVERY 8 HOURS PRN
Status: DISCONTINUED | OUTPATIENT
Start: 2024-12-31 | End: 2025-01-02 | Stop reason: HOSPADM

## 2024-12-31 RX ORDER — LIDOCAINE HYDROCHLORIDE 20 MG/ML
JELLY TOPICAL ONCE
Status: COMPLETED | OUTPATIENT
Start: 2024-12-31 | End: 2024-12-31

## 2024-12-31 RX ADMIN — ENOXAPARIN SODIUM 40 MG: 40 INJECTION SUBCUTANEOUS at 11:23

## 2024-12-31 RX ADMIN — ACETAMINOPHEN 975 MG: 325 TABLET ORAL at 11:23

## 2024-12-31 RX ADMIN — ACETAMINOPHEN 975 MG: 325 TABLET ORAL at 20:52

## 2024-12-31 RX ADMIN — GABAPENTIN 100 MG: 100 CAPSULE ORAL at 08:34

## 2024-12-31 RX ADMIN — GABAPENTIN 100 MG: 100 CAPSULE ORAL at 20:53

## 2024-12-31 RX ADMIN — ACETAMINOPHEN 975 MG: 325 TABLET ORAL at 03:33

## 2024-12-31 RX ADMIN — PANTOPRAZOLE SODIUM 40 MG: 40 TABLET, DELAYED RELEASE ORAL at 16:10

## 2024-12-31 RX ADMIN — SODIUM CHLORIDE, POTASSIUM CHLORIDE, SODIUM LACTATE AND CALCIUM CHLORIDE: 600; 310; 30; 20 INJECTION, SOLUTION INTRAVENOUS at 03:24

## 2024-12-31 RX ADMIN — LIDOCAINE HYDROCHLORIDE: 20 JELLY TOPICAL at 18:27

## 2024-12-31 RX ADMIN — SODIUM CHLORIDE, POTASSIUM CHLORIDE, SODIUM LACTATE AND CALCIUM CHLORIDE: 600; 310; 30; 20 INJECTION, SOLUTION INTRAVENOUS at 16:20

## 2024-12-31 RX ADMIN — GABAPENTIN 100 MG: 100 CAPSULE ORAL at 14:50

## 2024-12-31 ASSESSMENT — ACTIVITIES OF DAILY LIVING (ADL)
ADLS_ACUITY_SCORE: 42
ADLS_ACUITY_SCORE: 41
ADLS_ACUITY_SCORE: 42
ADLS_ACUITY_SCORE: 41
ADLS_ACUITY_SCORE: 42
ADLS_ACUITY_SCORE: 41
ADLS_ACUITY_SCORE: 42

## 2024-12-31 NOTE — PLAN OF CARE
Problem: Bowel Resection  Goal: Absence of Bleeding  Outcome: Progressing  Goal: Anesthesia/Sedation Recovery  Intervention: Optimize Anesthesia Recovery  Recent Flowsheet Documentation  Taken 12/30/2024 1600 by Antonio Edward RN  Safety Promotion/Fall Prevention:   clutter free environment maintained   nonskid shoes/slippers when out of bed   patient and family education  Goal: Optimal Pain Control and Function  Outcome: Not Progressing  Intervention: Prevent or Manage Pain  Recent Flowsheet Documentation  Taken 12/30/2024 1852 by Antonio Edward RN  Pain Management Interventions: (pca started) medication (see MAR)  Taken 12/30/2024 1652 by Antonio Edward RN  Pain Management Interventions: medication (see MAR)  Taken 12/30/2024 1519 by Antonio Edward RN  Pain Management Interventions:   emotional support   relaxation techniques promoted  Goal: Nausea and Vomiting Relief  Outcome: Not Progressing  Intervention: Prevent or Manage Nausea and Vomiting  Recent Flowsheet Documentation  Taken 12/30/2024 1600 by Antonio Edward RN  Nausea/Vomiting Interventions: antiemetic  Goal: Effective Urinary Elimination  Outcome: Progressing     Problem: Bowel Resection  Goal: Nausea and Vomiting Relief  Outcome: Not Progressing  Intervention: Prevent or Manage Nausea and Vomiting  Recent Flowsheet Documentation  Taken 12/30/2024 1600 by Antonio Edward RN  Nausea/Vomiting Interventions: antiemetic     Problem: Bowel Resection  Goal: Effective Urinary Elimination  Outcome: Progressing   Goal Outcome Evaluation:    Patient arrived to unit at 1450.  Surgical incisions clean, dry and intact.  Complaining of abdominal pain and experiencing high blood pressures.  Jeddo rectal group noted.  New order for dilaudid pca and labetalol prn.  Zofran given for nausea with some help.

## 2024-12-31 NOTE — PROGRESS NOTES
COLON & RECTAL SURGERY  PROGRESS NOTE  December 31, 2024    ROMAINE is a 59M w/ diverticulitis who is sp robotic sigmoidectomy on 12/20     SUBJECTIVE:    - Small emesis last night- mainly water  - No nausea, some bloating but notes improvement since last night  - Passing a small amt of gas  - Pain controlled    OBJECTIVE:  Temp:  [97.5  F (36.4  C)-98.7  F (37.1  C)] 97.8  F (36.6  C)  Pulse:  [61-96] 61  Resp:  [10-24] 20  BP: (138-196)/() 153/86  SpO2:  [92 %-98 %] 94 %    Intake/Output Summary (Last 24 hours) at 12/31/2024 0744  Last data filed at 12/31/2024 0657  Gross per 24 hour   Intake 4066.25 ml   Output 4125 ml   Net -58.75 ml       GENERAL:  Awake, alert, no acute distress  EXTREMITIES: Warm and well perfused  ABDOMEN:  Soft, appropriately tender, distended. No guarding, rigidity, or peritoneal signs  INCISION:  C/d/i, closed with dermabond     LABS: Reviewed     PLAN:   1. Advance diet to full liquid, discussed going slow. Awaiting robust return of bowel function   2. Multimodal pain control, PCA -- will switch to orals once tolerating a LFD   3. IVF till adequate PO intake   4. KEEP earl, will plan for CT cysto in 3 days given c/f colovesical fistula   5. Appropriate home medications, PPI ordered  6. OOB, ambulate  7. Await path   8. Lovenox for ppx        Chey Hackett MD - Fellow   [facility]  [address] [address]  T: [phone]  F: [phone]      COLORECTAL STAFF ADDENDUM  Patient seen and examined by me. Agree with above with following comments/additions:    Overall feeling better. No longer with nausea or vomiting. Tolerating liquids ok.had some liquid stool and flatus. Ambulating. Pain tolerable. His earl was removed  by nursing    AVSS  Abd soft, mildly distended, approp tender, incisions c/d/I    POD 1 s/p robotic sigmoid colectomy  Reinsert earl, low suspicion but possibly had a small colovesical fistula. Discussed with nursing. Will  check ct cysto on POD 3  Minimize IVF  Full liquid diet  PCA, will transition to oral pain meds when on LFD  Lovenox, ambulation  Partner to cover over holiday      Alina Lange MD  [facility]  Office: [phone]  12/31/2024 5:51 PM

## 2024-12-31 NOTE — PLAN OF CARE
Problem: Bowel Resection  Goal: Effective Oxygenation and Ventilation  Outcome: Progressing     Problem: Bowel Resection  Goal: Nausea and Vomiting Relief  Intervention: Prevent or Manage Nausea and Vomiting  Recent Flowsheet Documentation  Taken 12/30/2024 1947 by Dodie Saenz RN  Nausea/Vomiting Interventions: antiemetic     Problem: Bowel Resection  Goal: Optimal Pain Control and Function  Outcome: Progressing     Problem: Bowel Resection  Goal: Effective Bowel Elimination  Outcome: Progressing     Problem: Bowel Resection  Goal: Fluid and Electrolyte Balance  Intervention: Monitor and Manage Fluid and Electrolyte Balance  Recent Flowsheet Documentation  Taken 12/31/2024 0304 by Dodie Saenz, RN  Fluid/Electrolyte Management: fluids provided  Taken 12/30/2024 1947 by Dodie Saenz, RN  Fluid/Electrolyte Management: fluids provided     Goal Outcome Evaluation: Patient A/O, pleasant, SBA w/ IV, denies N/V during shift, on clear liq diet, LR running in R PIV at 75mL/hr, lap sites WDL ARTHUR, k mag recheck in AM. On PCA pump for pain mgt, lauren Tylenol given late, pt wanted to sleep. BP hypertensive 153/86, Frazier with large output, capno for monitoring. Last     Dodie Saenz, RN

## 2024-12-31 NOTE — PLAN OF CARE
Problem: Adult Inpatient Plan of Care  Goal: Optimal Comfort and Wellbeing  Outcome: Progressing  Intervention: Monitor Pain and Promote Comfort  Recent Flowsheet Documentation  Taken 12/31/2024 0834 by Antonio Edward RN  Pain Management Interventions: pain pump in use     Problem: Bowel Resection  Goal: Effective Bowel Elimination  Outcome: Progressing     Problem: Bowel Resection  Goal: Optimal Pain Control and Function  Outcome: Progressing  Intervention: Prevent or Manage Pain  Recent Flowsheet Documentation  Taken 12/31/2024 0834 by Antonio Edward RN  Pain Management Interventions: pain pump in use     Problem: Bowel Resection  Goal: Nausea and Vomiting Relief  Intervention: Prevent or Manage Nausea and Vomiting  Recent Flowsheet Documentation  Taken 12/31/2024 0850 by Antonio Edward RN  Nausea/Vomiting Interventions: antiemetic     Problem: Bowel Resection  Goal: Effective Oxygenation and Ventilation  Outcome: Progressing   Pt states pain controlled with dilaudid pca pump and current pain regimen.  Pt has been up in chair and ambulated with SBA in hallways without issues.  Incisions clean, dry and intact.  Tolerating full liquid diet.  No nausea or vomiting today.  States passing gas.  Has had loose, watery, brownish green stools today.

## 2024-12-31 NOTE — PLAN OF CARE
Dual Skin Assessment Note:    Patient transferred from PACU from to P2.     Comprehensive skin inspection completed by myself and Rosario Chan RN.     Abnormal skin assessment findings: none, just surgical incisions    LDA Initiated for skin breakdown/non-blanchable redness: Not applicable    Provider notified: Not applicable    If yes, WOC Consult order obtained: Not applicable    Antonio Edward RN 7:01 PM

## 2025-01-01 LAB
GLUCOSE BLDC GLUCOMTR-MCNC: 116 MG/DL (ref 70–99)
HOLD SPECIMEN: NORMAL
MAGNESIUM SERPL-MCNC: 2.1 MG/DL (ref 1.7–2.3)
POTASSIUM SERPL-SCNC: 4.1 MMOL/L (ref 3.4–5.3)

## 2025-01-01 PROCEDURE — 84132 ASSAY OF SERUM POTASSIUM: CPT | Performed by: COLON & RECTAL SURGERY

## 2025-01-01 PROCEDURE — 83735 ASSAY OF MAGNESIUM: CPT | Performed by: COLON & RECTAL SURGERY

## 2025-01-01 PROCEDURE — 250N000013 HC RX MED GY IP 250 OP 250 PS 637: Performed by: STUDENT IN AN ORGANIZED HEALTH CARE EDUCATION/TRAINING PROGRAM

## 2025-01-01 PROCEDURE — 250N000013 HC RX MED GY IP 250 OP 250 PS 637: Performed by: COLON & RECTAL SURGERY

## 2025-01-01 PROCEDURE — 250N000011 HC RX IP 250 OP 636: Performed by: STUDENT IN AN ORGANIZED HEALTH CARE EDUCATION/TRAINING PROGRAM

## 2025-01-01 PROCEDURE — 36415 COLL VENOUS BLD VENIPUNCTURE: CPT | Performed by: COLON & RECTAL SURGERY

## 2025-01-01 PROCEDURE — 250N000011 HC RX IP 250 OP 636: Performed by: COLON & RECTAL SURGERY

## 2025-01-01 PROCEDURE — 120N000001 HC R&B MED SURG/OB

## 2025-01-01 RX ORDER — OXYCODONE HYDROCHLORIDE 5 MG/1
5-10 TABLET ORAL EVERY 4 HOURS PRN
Status: DISCONTINUED | OUTPATIENT
Start: 2025-01-01 | End: 2025-01-02 | Stop reason: HOSPADM

## 2025-01-01 RX ORDER — HYDROMORPHONE HCL IN WATER/PF 6 MG/30 ML
0.2 PATIENT CONTROLLED ANALGESIA SYRINGE INTRAVENOUS
Status: DISCONTINUED | OUTPATIENT
Start: 2025-01-01 | End: 2025-01-02 | Stop reason: HOSPADM

## 2025-01-01 RX ADMIN — HYDROMORPHONE HYDROCHLORIDE 0.2 MG: 0.2 INJECTION, SOLUTION INTRAMUSCULAR; INTRAVENOUS; SUBCUTANEOUS at 12:52

## 2025-01-01 RX ADMIN — PANTOPRAZOLE SODIUM 40 MG: 40 TABLET, DELAYED RELEASE ORAL at 08:00

## 2025-01-01 RX ADMIN — GABAPENTIN 100 MG: 100 CAPSULE ORAL at 20:46

## 2025-01-01 RX ADMIN — ENOXAPARIN SODIUM 40 MG: 40 INJECTION SUBCUTANEOUS at 12:52

## 2025-01-01 RX ADMIN — OXYCODONE HYDROCHLORIDE 5 MG: 5 TABLET ORAL at 23:28

## 2025-01-01 RX ADMIN — ACETAMINOPHEN 975 MG: 325 TABLET ORAL at 12:51

## 2025-01-01 RX ADMIN — GABAPENTIN 100 MG: 100 CAPSULE ORAL at 14:42

## 2025-01-01 RX ADMIN — GABAPENTIN 100 MG: 100 CAPSULE ORAL at 08:00

## 2025-01-01 RX ADMIN — ACETAMINOPHEN 975 MG: 325 TABLET ORAL at 03:08

## 2025-01-01 RX ADMIN — ACETAMINOPHEN 975 MG: 325 TABLET ORAL at 19:00

## 2025-01-01 ASSESSMENT — ACTIVITIES OF DAILY LIVING (ADL)
ADLS_ACUITY_SCORE: 42

## 2025-01-01 NOTE — PLAN OF CARE
"Goal Outcome Evaluation:                        Problem: Adult Inpatient Plan of Care  Goal: Plan of Care Review  Description: The Plan of Care Review/Shift note should be completed every shift.  The Outcome Evaluation is a brief statement about your assessment that the patient is improving, declining, or no change.  This information will be displayed automatically on your shift  note.  Outcome: Progressing     Problem: Adult Inpatient Plan of Care  Goal: Patient-Specific Goal (Individualized)  Description: You can add care plan individualizations to a care plan. Examples of Individualization might be:  \"Parent requests to be called daily at 9am for status\", \"I have a hard time hearing out of my right ear\", or \"Do not touch me to wake me up as it startles  me\".  Outcome: Progressing     Problem: Adult Inpatient Plan of Care  Goal: Optimal Comfort and Wellbeing  Outcome: Progressing   Patient is alert and oriented. Made comfortable in bed with earl inserted for surgical reasons. Due medications administered, reassurance given to allay anxiety.  "

## 2025-01-01 NOTE — PROGRESS NOTES
COLON & RECTAL SURGERY  PROGRESS NOTE    JW is a 59M w/ diverticulitis who is sp robotic sigmoidectomy on 12/20     SUBJECTIVE:    - Passing gas, loose liquid Bms  - Tolerating PO without nausea or emesis  - Walking around the unit  - Pain controlled     OBJECTIVE:  Temp:  [97.8  F (36.6  C)-99  F (37.2  C)] 98.3  F (36.8  C)  Pulse:  [58-76] 61  Resp:  [16-20] 20  BP: (136-163)/(80-91) 163/91  SpO2:  [95 %-96 %] 96 %      Intake/Output Summary (Last 24 hours) at 1/1/2025 1242  Last data filed at 1/1/2025 0801  Gross per 24 hour   Intake 1360.75 ml   Output 2600 ml   Net -1239.25 ml     GENERAL:  Awake, alert, no acute distress  EXTREMITIES: Warm and well perfused  ABDOMEN:  Soft, appropriately tender, minimally distended. No guarding, rigidity, or peritoneal signs  INCISION:  C/d/i, closed with dermabond   LDA: Earl in place    LABS: Reviewed     PLAN:   1. Low fiber diet   2. Multimodal pain control, transitioned to oral pain regimen    3. Discontinue IVF  4. KEEP earl, will plan for CT cysto POD3 given c/f colovesical fistula   5. Appropriate home medications, PPI ordered  6. OOB, ambulate  7. Await path   8. Lovenox for ppx

## 2025-01-01 NOTE — PLAN OF CARE
Pain 5/10. Up with SBA and having diarrhea. PCA pump stopped. Low fiber diet started. Frazier draining.

## 2025-01-01 NOTE — PLAN OF CARE
"Goal Outcome Evaluation:      Plan of Care Reviewed With: patient    Overall Patient Progress: improvingOverall Patient Progress: improving    Outcome Evaluation: Pt A&O and stand-by-assist due to IV and earl. Had a BM. Denied nausea. Pain well managed with PCA. Incisions with dermabond healing well.    BP (!) 153/85 (BP Location: Left arm)   Pulse 60   Temp 98.7  F (37.1  C) (Oral)   Resp 16   Ht 1.778 m (5' 10\")   Wt 94.6 kg (208 lb 8.9 oz)   SpO2 96%   BMI 29.92 kg/m        Problem: Adult Inpatient Plan of Care  Goal: Optimal Comfort and Wellbeing  Outcome: Progressing  Intervention: Monitor Pain and Promote Comfort  Recent Flowsheet Documentation  Taken 1/1/2025 0308 by Enedina Sen, RN  Pain Management Interventions:   medication (see MAR)   care clustered   repositioned     Problem: Bowel Resection  Goal: Effective Bowel Elimination  Outcome: Progressing     Problem: Bowel Resection  Goal: Absence of Infection Signs and Symptoms  Outcome: Progressing     "

## 2025-01-02 ENCOUNTER — APPOINTMENT (OUTPATIENT)
Dept: CT IMAGING | Facility: HOSPITAL | Age: 60
DRG: 331 | End: 2025-01-02
Attending: COLON & RECTAL SURGERY
Payer: COMMERCIAL

## 2025-01-02 VITALS
BODY MASS INDEX: 29.86 KG/M2 | RESPIRATION RATE: 20 BRPM | WEIGHT: 208.56 LBS | TEMPERATURE: 98.3 F | HEIGHT: 70 IN | HEART RATE: 89 BPM | DIASTOLIC BLOOD PRESSURE: 86 MMHG | OXYGEN SATURATION: 96 % | SYSTOLIC BLOOD PRESSURE: 137 MMHG

## 2025-01-02 LAB
MAGNESIUM SERPL-MCNC: 2.1 MG/DL (ref 1.7–2.3)
PLATELET # BLD AUTO: 180 10E3/UL (ref 150–450)
POTASSIUM SERPL-SCNC: 4.1 MMOL/L (ref 3.4–5.3)

## 2025-01-02 PROCEDURE — 250N000011 HC RX IP 250 OP 636: Performed by: COLON & RECTAL SURGERY

## 2025-01-02 PROCEDURE — 83735 ASSAY OF MAGNESIUM: CPT | Performed by: COLON & RECTAL SURGERY

## 2025-01-02 PROCEDURE — 84132 ASSAY OF SERUM POTASSIUM: CPT | Performed by: COLON & RECTAL SURGERY

## 2025-01-02 PROCEDURE — 250N000011 HC RX IP 250 OP 636: Performed by: STUDENT IN AN ORGANIZED HEALTH CARE EDUCATION/TRAINING PROGRAM

## 2025-01-02 PROCEDURE — 72194 CT PELVIS W/O & W/DYE: CPT

## 2025-01-02 PROCEDURE — 36415 COLL VENOUS BLD VENIPUNCTURE: CPT | Performed by: COLON & RECTAL SURGERY

## 2025-01-02 PROCEDURE — BT101ZZ FLUOROSCOPY OF BLADDER USING LOW OSMOLAR CONTRAST: ICD-10-PCS | Performed by: RADIOLOGY

## 2025-01-02 PROCEDURE — 250N000013 HC RX MED GY IP 250 OP 250 PS 637: Performed by: COLON & RECTAL SURGERY

## 2025-01-02 PROCEDURE — 85049 AUTOMATED PLATELET COUNT: CPT | Performed by: COLON & RECTAL SURGERY

## 2025-01-02 RX ORDER — IOPAMIDOL 755 MG/ML
20 INJECTION, SOLUTION INTRAVASCULAR ONCE
Status: COMPLETED | OUTPATIENT
Start: 2025-01-02 | End: 2025-01-02

## 2025-01-02 RX ORDER — OXYCODONE HYDROCHLORIDE 5 MG/1
5 TABLET ORAL EVERY 6 HOURS PRN
Qty: 12 TABLET | Refills: 0 | Status: SHIPPED | OUTPATIENT
Start: 2025-01-02

## 2025-01-02 RX ORDER — IOPAMIDOL 755 MG/ML
20 INJECTION, SOLUTION INTRAVASCULAR ONCE
Status: DISCONTINUED | OUTPATIENT
Start: 2025-01-02 | End: 2025-01-02

## 2025-01-02 RX ADMIN — ACETAMINOPHEN 975 MG: 325 TABLET ORAL at 11:45

## 2025-01-02 RX ADMIN — GABAPENTIN 100 MG: 100 CAPSULE ORAL at 13:57

## 2025-01-02 RX ADMIN — ACETAMINOPHEN 975 MG: 325 TABLET ORAL at 03:06

## 2025-01-02 RX ADMIN — PANTOPRAZOLE SODIUM 40 MG: 40 TABLET, DELAYED RELEASE ORAL at 08:10

## 2025-01-02 RX ADMIN — GABAPENTIN 100 MG: 100 CAPSULE ORAL at 08:10

## 2025-01-02 RX ADMIN — IOPAMIDOL 20 ML: 755 INJECTION, SOLUTION INTRAVENOUS at 15:35

## 2025-01-02 RX ADMIN — ENOXAPARIN SODIUM 40 MG: 40 INJECTION SUBCUTANEOUS at 11:47

## 2025-01-02 ASSESSMENT — ACTIVITIES OF DAILY LIVING (ADL)
ADLS_ACUITY_SCORE: 42
ADLS_ACUITY_SCORE: 42
ADLS_ACUITY_SCORE: 41
ADLS_ACUITY_SCORE: 42
ADLS_ACUITY_SCORE: 41
ADLS_ACUITY_SCORE: 42
ADLS_ACUITY_SCORE: 41
ADLS_ACUITY_SCORE: 42
ADLS_ACUITY_SCORE: 41
ADLS_ACUITY_SCORE: 42

## 2025-01-02 NOTE — PLAN OF CARE
Problem: Surgery Nonspecified  Goal: Optimal Pain Control and Function  Outcome: Progressing   Goal Outcome Evaluation:       Ambulated in hallway this shift x1. Had scheduled Tylenol and Gabapentin. Denied need for Oxycodone.

## 2025-01-02 NOTE — PLAN OF CARE
Problem: Adult Inpatient Plan of Care  Goal: Plan of Care Review  Description: The Plan of Care Review/Shift note should be completed every shift.  The Outcome Evaluation is a brief statement about your assessment that the patient is improving, declining, or no change.  This information will be displayed automatically on your shift  note.  Outcome: Progressing     Problem: Bowel Resection  Goal: Effective Bowel Elimination  Outcome: Progressing  Goal: Nausea and Vomiting Relief  Outcome: Progressing     Problem: Surgery Nonspecified  Goal: Optimal Pain Control and Function  Outcome: Progressing  Intervention: Prevent or Manage Pain  Recent Flowsheet Documentation  Taken 1/1/2025 1631 by Yaw Zuniga, RN  Pain Management Interventions:   medication (see MAR)   care clustered   repositioned   Goal Outcome Evaluation:         A&O x4  VSS on RA  Pain reported from 5-7, PRN Oxycodone given x1  Denied nausea  Frazire patent  Ambulating w/SBA  Remains on low fiber diet  Slept overnight  Able to make needs known    Yaw Zuniga, RN

## 2025-01-02 NOTE — PROGRESS NOTES
COLON [facility] RECTAL SURGERY  PROGRESS NOTE    ROMAINE is a 59M w/ diverticulitis who is sp robotic sigmoidectomy on 12/20     SUBJECTIVE:    - Passing gas and having loose bowel movements  - Minimal pain   - Tolerating diet without issue  - Earl in place, planning for CT cysto today     OBJECTIVE:  Temp:  [97.8  F (36.6  C)-98.7  F (37.1  C)] 98.7  F (37.1  C)  Pulse:  [58-74] 70  Resp:  [18-20] 20  BP: (136-163)/(79-92) 146/92  SpO2:  [95 %-96 %] 96 %      Intake/Output Summary (Last 24 hours) at 1/2/2025 0699  Last data filed at 1/2/2025 0431  Gross per 24 hour   Intake 846 ml   Output 5200 ml   Net -4354 ml       GENERAL:  Awake, alert, no acute distress  EXTREMITIES: Warm and well perfused  ABDOMEN:  Soft, minimally tender, not distended. No guarding, rigidity, or peritoneal signs  INCISION:  C/d/i, closed with dermabond   LDA: Earl in place    LABS: Reviewed     PLAN:   1. Low fiber diet   2. Multimodal pain control, transitioned to oral pain regimen    3. Plan for CT cysto today given c/f colovesical fistula  - if negative, will remove earl   4. Appropriate home medications, PPI ordered  5. OOB, ambulate  6. Await path   7. Lovenox for ppx    Anticipate discharge today

## 2025-01-02 NOTE — PROCEDURES
[facility] Operative Note    Date of Procedure: 12/30/24    Preoperative diagnosis: chronic recurrent sigmoid diverticulitis    Postoperative diagnosis: same    Procedure performed:   1. Robotic sigmoid colectomy  2. Rigid proctoscopy      Surgeon: Alina Lange MD    Assistant: Anand ROCHE - a skilled assistant was required to aid in bedside assistance, exposure and retraction    Anesthesia: General endotracheal    Estimated blood loss: 50 ml    Specimens: 1: sigmoid colon    Complications: none    Findings: distal sigmoid with chronic inflammatory changes, stuck to the anterior abdominal wall, possible small colovesical fistula      Indication for procedure:   The patient is a 59 year old year old male who presented with chronic recurrent sigmoid diverticulitis. We discussed an elective sigmoid colectomy. The risks and benefits of surgery were thoroughly discussed with the patient including but not limited to infection, bleeding, pneumonia, blood clots, heart attack, prolonged intubation, injury to other structures, anastomotic leak (should we perform an anastomosis), potential need for ileostomy or colostomy, and even death. Alternatives to surgery and the expected postoperative course were also discussed with the patient as well.The patient decided to proceed with surgical resection. Informed consent was obtained.     Description of procedure:   On 12/30/24 the patient was brought to the operating room.  The patient was placed on the operating table in lithotomy position on the pink Pigazzi pad and general endotracheal anesthesia was achieved. A earl catheter was inserted by the circulating nurse. All pressure points were padded with gel and foam as appropriate and the patient was secured to the operating room table with a lap belt and the chest was secured with a foam strap. Both arms were tucked at the patient's side. The patient's abdomen was prepped and draped in usual sterile fashion.  Prior to the procedure a timeout was performed per protocol and preoperative antibiotics were given per SCIP measures.              We began with a supraumbilical 5mm incision to enter the abdomen with the optical trocar without difficulty. We insufflated the abdomen to 15mmHg and surveyed the abdomen. There was no injury from entry. We could see the inflamed distal sigmoid colon stuck up to the left anterior lower abdominal wall. We then placed our remaining robotic ports diagonally from the right ASIS up towards the xiphoid with three 8mm robotic trocars replacing our 5mm port. The RLQ port was upsized to a 12mm robotic port and we placed our closing fascial sutures at the beginning with two 0 vicryl sutures on a suture passer. We also placed a 5mm RUQ airseal assist port. We then placed the patient in steep trendelenburg and left side elevated. We swept the small bowel out of the pelvis.     We then docked the robot and placed our instruments under direct vision. We then proceeded to mobilize the left colon in a medial to lateral fashion. We scored the peritoneum at the sacral promontory and moved superiorly towards the TORO pedicle. We entered the plane to begin our dissection laterally. We identified the left ureter which was pushed away from our dissection in the retroperitoneum. We continued our dissection superiorly until we reached the TORO pedicle. We then cleared the pedicle and this was ligated near its origin to help with our reach later for the anastomosis. This was taken with the robotic stapler with a white load. We continued our dissection superiorly towards the ligament of treitz. We continued our medial to lateral towards the splenic flexure. We continued towards the pelvis.    We then turned our attention to taking the lateral attachments of the left colon which were taken down along the white line of toldt to meet our medial to lateral dissection. The inflamed portion of colon was somewhat stuck  up to the left anterior lower abdominal wall, which was freed up with a combination of blunt dissection and cautery. There was maybe a very small colovesical fistula in this area though it was somewhat away from the bladder. This was carried up all the way to the splenic flexure. We then continued this down towards the pelvis. We identified an area at the top of the rectum at the promontory suitable for our transection point at about the sacral promontory. We cleared the mesorectum with the vessel sealer. This was then transected with single firing of the 60 green load robotic stapler.     We then reassessed our colon to find a suitable area for anastomosis and to assess our reach. We identified an area on the sigmoid/descending junction. We then took the mesentery of the colon up towards this point with the vessel sealer. We then had anesthesia administer ICG. Our colonic conduit was well perfused as was the rectal stump. We then undocked the robot and made a pfannenstiel incision. We placed a medium venkata and brought out our portion of the colon. We felt this area for our anastomosis and it was soft and healthy appearing with a soft mesentery. This was cleared off a bit more and then divided between to ellis clamps. The specimen was sent for open and return. On return there were no mucosal lesions seen.    The anvil of a 29 EEA blue powered stapler was secured in the proximal end of the colon with a 2-0 double armed prolene suture and 3-0 vicryl suture. This was returned to the abdomen and we then reinsufflated to perform the anastomosis laparoscopically. My assistent went below and passed EEA sizers sequentially. The stapler was placed transanally and the pin brought out just posteiror to the staple line. The anvil and pin were mated. We double checked the orientation of our conduit which was straight. The stapler was closed and then fired. The stapler was removed and the anastomotic rings were inspected and  were intact. A leak test was performed with rigid proctoscopy from below and was negative. There was no tension on the anastomosis. The anastomosis was at 13cm. Hemostasis was achieved. We all moved to the closing tray and changed our gown and gloves and set up our clean closure.    The peritoneum of the Pfannenstiel incision was closed with 2-0-Vicryl suture. We tied down our previously placed 12mm port fascial sutures. We closed the fascia with two #1 PDS sutures. The subcutaneous tissues were irrigated again. The skin was closed with 4-0 monocryl and dermabond was applied.    All sponge, needle and instrument counts were correct x 2 at the conclusion of the case.     The patient was awakened from anesthesia and extubated in the operating room. The patient tolerated the procedure well and was transferred to the PACU.     Alina Lange MD  [facility]  Office: [phone]  1/2/2025 8:46 AM

## 2025-01-02 NOTE — PLAN OF CARE
Problem: Adult Inpatient Plan of Care  Goal: Plan of Care Review  Description: The Plan of Care Review/Shift note should be completed every shift.  The Outcome Evaluation is a brief statement about your assessment that the patient is improving, declining, or no change.  This information will be displayed automatically on your shift  note.  Outcome: Progressing  Flowsheets (Taken 1/2/2025 1129)  Plan of Care Reviewed With: patient   Goal Outcome Evaluation:      Plan of Care Reviewed With: patient      Patient denied pain throughout the shift.  Bowel function improving.  Patient reported passing both flatus and stool.    Earl catheter draining yellow urine.  Patient waiting for CT results prior to earl and discharge plan.    Patient has steady gait.   Ambulating independently in hallways.

## 2025-01-03 NOTE — PROGRESS NOTES
Discharge time: 1852  Patient discharged to home. Patient left with his girlfriend. AVS discharge packet given to patient. Questions and concerns answered.

## 2025-01-06 NOTE — DISCHARGE SUMMARY
Discharge Summary    Patient name: Chase Mcdaniels  YOB: 1965   Age: 59 year old  Medical Record Number: [id number]  Primary Care Physician:  Shannon Horne       Admission Date: 12/30/2024.  Discharge Date: 1/2/2025.  Condition at Discharge: STABLE       Principal Diagnosis: Recurrent sigmoid diverticulitis     HISTORY OF PRESENT ILLNESS: The patient is a 59 year old year old male who presented with chronic recurrent sigmoid diverticulitis. We discussed an elective sigmoid colectomy.     PROCEDURES PERFORMED DURING HOSPITALIZATION:   1. Robotic sigmoid colectomy  2. Rigid proctoscopy    FINAL PATHOLOGY:   Final Diagnosis   A) SEGMENT OF SIGMOID COLON:        -  DIVERTICULOSIS        -  FOCAL ORGANIZED SEROSAL FIBROSIS WITH CHRONIC INFLAMMATION,                CONSISTENT WITH PREVIOUS DIVERTICULAR RUPTURE        -  NEGATIVE FOR ACUTE PERFORATION OR PURULENT INFLAMMATION        -  NO PRIMARY MUCOSAL LESIONS        -  NORMAL TISSUE AT SURGICAL MARGINS     B) GROSS INSPECTION:         -  TWO RINGS OF NORMAL COLON TISSUE CONSISTENT WITH ANASTOMOSIS RINGS       BRIEF HOSPITAL COURSE: This 59 year old male presented for an elective robotic sigmoid colectomy and was transferred to the surgical reis following the procedure.  Diet was advanced with return of bowel function.  Pain medication was transitioned from IV to oral uneventfully.  A CT cystogram was completed on POD #3 and was negative for a bladder leak, so the earl catheter was removed. At the time of discharge, the patient was voiding freely, tolerating a regular diet, and pain was controlled with oral pain medications.  The patient was discharged home on POD #3 in stable condition.     PHYSICAL EXAM ON DATE OF DISCHARGE: See daily progress noted dated 1/2/25    Vital Signs in last 24 hours:   Temp:  [97.8  F (36.6  C)-98.7  F (37.1  C)] 98.7  F (37.1  C)  Pulse:  [58-74] 70  Resp:  [18-20] 20  BP: (136-163)/(79-92) 146/92  SpO2:  [95 %-96 %]  96 %    COMPLICATIONS IN HOSPITAL: none    IMPORTANT PENDING TEST RESULTS: none    Discharge Medications/Orders:     Review of your medicines        START taking        Dose / Directions   oxyCODONE 5 MG tablet  Commonly known as: ROXICODONE  Used for: Diverticulitis      Dose: 5 mg  Take 1 tablet (5 mg) by mouth every 6 hours as needed for moderate to severe pain.  Quantity: 12 tablet  Refills: 0            CONTINUE these medicines which have NOT CHANGED        Dose / Directions   pantoprazole 40 MG EC tablet  Commonly known as: PROTONIX      Dose: 40 mg  Take 40 mg by mouth daily. BEFORE A MEAL  Refills: 0               Where to get your medicines        These medications were sent to [facility] [place] [id number] [employer]  [address] [place] [address], [facility] [address]      Phone: [phone]   oxyCODONE 5 MG tablet           FOLLOW UP: He should see Shannon Horne in one week.  Follow up with Dr. Lange's office in 3-4 weeks.     Total time spent for discharge on date of discharge: 20 minutes, with over half spent in counseling and coordinating care    I did not see the patient on the date of discharge.    Haley Jerry PA-C  [facility]  [phone]    ADDENDUM:  Length of stay: 3 days  Indicate Y or N for the following:  UTI NO  C diff NO  PNA NO  SSI NO  DVT NO  PE NO  CVA NO  MI NO  Enterocutaneous fistula NO  Peripheral nerve injury NO  Abscess (not adjacent to anastomosis) NO  Leak NO                        Treated with:              Antibiotics NO              Drain NO              Reoperation NO  Death within 30 days NO  Reintubation NO  Reoperation NO              Procedure

## 2025-07-20 ENCOUNTER — HEALTH MAINTENANCE LETTER (OUTPATIENT)
Age: 60
End: 2025-07-20

## (undated) DEVICE — DRAPE SHEET TABLE COVER KC 42301*

## (undated) DEVICE — SUCTION TIP YANKAUER W/O VENT K86

## (undated) DEVICE — DRAPE SHEET HALF 40X60" 9358

## (undated) DEVICE — JELLY LUBRICATING SURGILUBE 2OZ TUBE

## (undated) DEVICE — SUCTION STRYKERFLOW II 250-070-500

## (undated) DEVICE — MAT FLOOR WATERPROOF BACKSHEET FMBP30

## (undated) DEVICE — STPL CIRCULAR 29MM CVD CDH29P

## (undated) DEVICE — STPL DAVINCI SUREFORM 60MM RELOAD GREEN 48360G

## (undated) DEVICE — SU PROLENE 2-0 SHDA 36" 8523H

## (undated) DEVICE — TUBING SUCTION MEDI-VAC 1/4"X20' N620A

## (undated) DEVICE — CATH TRAY FOLEY SURESTEP 16FR DRAIN BAG STATOCK A899916

## (undated) DEVICE — CUSTOM PACK LAP CHOLE SBA5BLCHEA

## (undated) DEVICE — DAVINCI XI HANDPIECE ESU VESSEL SEALER 8MM EXT 480422

## (undated) DEVICE — TROCAR PORT BLADELESS 5X100MM IAS5-100LP

## (undated) DEVICE — BULB W/BLADDER TUBING STRL DISP

## (undated) DEVICE — SUCTION INST SIGMOID 3304

## (undated) DEVICE — SUTURE PDS 1 CTB-1 ZB347

## (undated) DEVICE — SOL WATER IRRIG 1000ML BOTTLE 2F7114

## (undated) DEVICE — ESU PENCIL SMOKE EVAC W/ROCKER SWITCH 0703-047-000

## (undated) DEVICE — DRAPE POUCH INSTRUMENT 3 POCKET 1018L

## (undated) DEVICE — CUSTOM PACK COLON CLOSING SBA5BCCHEA

## (undated) DEVICE — ENDO TROCAR OPTICAL ACCESS KII Z-THRD 05X100MM CTR03

## (undated) DEVICE — PROTECTOR ARM STANDARD ONE STEP 40433 (COI)

## (undated) DEVICE — STPL DAVINCI SUREFORM 60MM RELOAD WHITE 48360W

## (undated) DEVICE — GLOVE PI ULTRATCH M LF SZ 6.5 PF CUFF TEXT STRL LF 42665

## (undated) DEVICE — STRAP CATH LEG ADJUSTABLE 0814-8200

## (undated) DEVICE — SU VICRYL+ 3-0 27IN SH UND VCP416H

## (undated) DEVICE — SPONGE LAP 18X18" X8435

## (undated) DEVICE — LUBRICANT INST ELECTROLUBE EL101

## (undated) DEVICE — DAVINCI HOT SHEARS TIP COVER  400180

## (undated) DEVICE — GOWN LG DISP 9515

## (undated) DEVICE — ENDO SHEARS RENEW LAP ENDOCUT SCISSOR TIP 16.5MM 3142

## (undated) DEVICE — DRAPE IOBAN INCISE 23X17" 6650EZ

## (undated) DEVICE — DAVINCI XI SEAL UNIVERSAL 5-12MM 470500

## (undated) DEVICE — CLIP APPLIER ENDO 5MM M/L LIGAMAX EL5ML

## (undated) DEVICE — DAVINCI XI REDUCER 8-12MM 470381

## (undated) DEVICE — DAVINCI XI OBTURATOR BLADELESS 8MM 470359

## (undated) DEVICE — POSITIONING KIT THE PINK PAD XL 40X20X1IN 40583 (COI)

## (undated) DEVICE — DRAPE U SPLIT 74X120" 29440

## (undated) DEVICE — ANTIFOG SOLUTION SEE SHARP 150M TROCAR SWABS 30978 (COI)

## (undated) DEVICE — DAVINCI XI DRAPE ARM 470015

## (undated) DEVICE — PREP CHLORAPREP 26ML TINTED HI-LITE ORANGE 930815

## (undated) DEVICE — STPL DAVINCI SUREFORM 60MM STR 480460

## (undated) DEVICE — DAVINCI XI DRAPE COLUMN 470341

## (undated) DEVICE — SU MONOCRYL+ 4-0 18IN PS2 UND MCP496G

## (undated) DEVICE — Device

## (undated) DEVICE — SU VICRYL+ 2-0 TIES 18 UND VCP111G

## (undated) DEVICE — GLOVE UNDER INDICATOR PI SZ 6.5 LF 41665

## (undated) DEVICE — GRASPER LAPAROSCOPIC EPIX 5MMX35CM C4130

## (undated) DEVICE — SYR 50ML SLIP TIP W/O NDL 309654

## (undated) DEVICE — DRAPE LEGGINGS 8421

## (undated) DEVICE — TUBING FILTER TRI-LUMEN AIRSEAL ASC-EVAC1

## (undated) DEVICE — SU DERMABOND ADVANCED .7ML DNX12

## (undated) RX ORDER — PROPOFOL 10 MG/ML
INJECTION, EMULSION INTRAVENOUS
Status: DISPENSED
Start: 2024-12-30

## (undated) RX ORDER — FENTANYL CITRATE 50 UG/ML
INJECTION, SOLUTION INTRAMUSCULAR; INTRAVENOUS
Status: DISPENSED
Start: 2024-12-30

## (undated) RX ORDER — DEXAMETHASONE SODIUM PHOSPHATE 10 MG/ML
INJECTION, SOLUTION INTRAMUSCULAR; INTRAVENOUS
Status: DISPENSED
Start: 2024-12-30

## (undated) RX ORDER — ONDANSETRON 2 MG/ML
INJECTION INTRAMUSCULAR; INTRAVENOUS
Status: DISPENSED
Start: 2024-12-30

## (undated) RX ORDER — LIDOCAINE HYDROCHLORIDE 10 MG/ML
INJECTION, SOLUTION EPIDURAL; INFILTRATION; INTRACAUDAL; PERINEURAL
Status: DISPENSED
Start: 2024-12-30

## (undated) RX ORDER — INDOCYANINE GREEN AND WATER 25 MG
KIT INJECTION
Status: DISPENSED
Start: 2024-12-30